# Patient Record
Sex: MALE | Race: WHITE | Employment: FULL TIME | URBAN - METROPOLITAN AREA
[De-identification: names, ages, dates, MRNs, and addresses within clinical notes are randomized per-mention and may not be internally consistent; named-entity substitution may affect disease eponyms.]

---

## 2017-01-09 ENCOUNTER — OFFICE VISIT (OUTPATIENT)
Dept: FAMILY MEDICINE CLINIC | Facility: CLINIC | Age: 62
End: 2017-01-09

## 2017-01-09 ENCOUNTER — TELEPHONE (OUTPATIENT)
Dept: FAMILY MEDICINE CLINIC | Facility: CLINIC | Age: 62
End: 2017-01-09

## 2017-01-09 VITALS
TEMPERATURE: 97 F | DIASTOLIC BLOOD PRESSURE: 76 MMHG | HEIGHT: 70 IN | SYSTOLIC BLOOD PRESSURE: 150 MMHG | HEART RATE: 72 BPM | RESPIRATION RATE: 20 BRPM | BODY MASS INDEX: 30.64 KG/M2 | WEIGHT: 214 LBS

## 2017-01-09 DIAGNOSIS — M54.41 ACUTE RIGHT-SIDED LOW BACK PAIN WITH RIGHT-SIDED SCIATICA: Primary | ICD-10-CM

## 2017-01-09 PROCEDURE — 99214 OFFICE O/P EST MOD 30 MIN: CPT | Performed by: FAMILY MEDICINE

## 2017-01-09 PROCEDURE — 99213 OFFICE O/P EST LOW 20 MIN: CPT | Performed by: FAMILY MEDICINE

## 2017-01-09 PROCEDURE — 96372 THER/PROPH/DIAG INJ SC/IM: CPT | Performed by: FAMILY MEDICINE

## 2017-01-09 RX ORDER — PREDNISONE 20 MG/1
TABLET ORAL
Qty: 18 TABLET | Refills: 0 | Status: SHIPPED | OUTPATIENT
Start: 2017-01-09 | End: 2017-05-05

## 2017-01-09 RX ORDER — KETOROLAC TROMETHAMINE 30 MG/ML
30 INJECTION, SOLUTION INTRAMUSCULAR; INTRAVENOUS ONCE
Status: COMPLETED | OUTPATIENT
Start: 2017-01-09 | End: 2017-01-09

## 2017-01-09 RX ORDER — METHOCARBAMOL 750 MG/1
750 TABLET, FILM COATED ORAL 3 TIMES DAILY
Qty: 40 TABLET | Refills: 0 | Status: SHIPPED | OUTPATIENT
Start: 2017-01-09 | End: 2017-01-19

## 2017-01-09 RX ADMIN — KETOROLAC TROMETHAMINE 30 MG: 30 INJECTION, SOLUTION INTRAMUSCULAR; INTRAVENOUS at 15:04:00

## 2017-01-09 NOTE — TELEPHONE ENCOUNTER
Patient asking if he can something for his discomfort from hip down to ankles. He feels like he has a pinched nerve. Patient is having problems walking. Pain is so bad he is grinding his teeth. He is at work drives a truck.  He will  Be off at 11 AM.  He d

## 2017-01-09 NOTE — PROGRESS NOTES
Patient ID: Kim Adams is a 63year old male.       Telephone Encounter by Cady Okeefe RN at 1/9/2017  8:22 AM      Author: Cady Okeefe RN Service: (none) Author Type: Registered Nurse     Filed: 1/9/2017  8:25 AM Note Time: 1/9/2017  8: what but is in such pain he does not know what to do. He has been placing heat on his back but that has not really helped. He feels a bit of spasm on the right low back.   .     Wt Readings from Last 6 Encounters:  01/09/17 : 214 lb (97.07 kg)  12/27/16 : 20, height 5' 10\" (1.778 m), weight 214 lb (97.07 kg).    01/09/17  1441   BP: 150/76   Pulse: 72   Temp: 97.1 °F (36.2 °C)   TempSrc: Oral   Resp: 20   Height: 5' 10\" (1.778 m)   Weight: 214 lb (97.07 kg)            ASSESSMENT/PLAN:     Diagnoses and all

## 2017-01-09 NOTE — TELEPHONE ENCOUNTER
Patient states has had this issue in the past and was given muscle relaxants he thinks. States that the pain started a few days ago on his right side, states that it goes from his ride back area to his ankle.  States it is shooting pain , has been told

## 2017-01-12 ENCOUNTER — TELEPHONE (OUTPATIENT)
Dept: FAMILY MEDICINE CLINIC | Facility: CLINIC | Age: 62
End: 2017-01-12

## 2017-01-12 RX ORDER — DICLOFENAC SODIUM 75 MG/1
75 TABLET, DELAYED RELEASE ORAL 2 TIMES DAILY
Qty: 42 TABLET | Refills: 0 | Status: SHIPPED | OUTPATIENT
Start: 2017-01-12 | End: 2017-02-02

## 2017-01-12 NOTE — TELEPHONE ENCOUNTER
Spoke to pt, states that he is almost out of his prednisone and is concerned because he is still very uncomfortable. He is taking Methocarbamol once in a while, is unable to take it regularly due to his job as a . Using ice whenever possible.  He is a

## 2017-01-12 NOTE — TELEPHONE ENCOUNTER
Spoke to pt, informed him of medication changes and all other advice/information from Dr.   Informed him call back next week if pain is unchanged and not improving. Pt voices understanding, denies questions at this time.     FYI - Pt states that he was maryuri

## 2017-01-12 NOTE — TELEPHONE ENCOUNTER
The prednisone should last him for 9 days by the way. Is he taking it correctly as I just wrote for it on January 9, 2017.

## 2017-01-12 NOTE — TELEPHONE ENCOUNTER
Stay on the Robaxin for muscle relaxant. After he is finished with the prednisone he can start a medicine called diclofenac that he could take twice daily with food.   They only do a steroid shot into the spinal area if he is still in pain after 6-8 weeks

## 2017-01-12 NOTE — TELEPHONE ENCOUNTER
Pt is having pain when trying to lay down or when walking. Pt has taken all of the medication given to him and he is now out. Pt would like to know what to do next?

## 2017-01-13 NOTE — TELEPHONE ENCOUNTER
While.  He took much too much medicine. He was only supposed to take 3 tablets daily for 3 days and then 2 tablets daily for 3 days and then 1 tablet daily for 3 days. Unfortunately he took the medicines 3 times daily.   We will not fill the prednisone ag

## 2017-01-13 NOTE — TELEPHONE ENCOUNTER
Spoke to patient; went over  VS recent note below. Went over everything regarding patient's medication regimen. Patient feels better today regarding back pain. Patient will start diclofenac if continues with pain.   Discussed to take with food; may use

## 2017-01-30 ENCOUNTER — LAB ENCOUNTER (OUTPATIENT)
Dept: LAB | Age: 62
End: 2017-01-30
Attending: FAMILY MEDICINE
Payer: COMMERCIAL

## 2017-01-30 DIAGNOSIS — Z00.00 ROUTINE GENERAL MEDICAL EXAMINATION AT A HEALTH CARE FACILITY: ICD-10-CM

## 2017-01-30 LAB
ALBUMIN SERPL BCP-MCNC: 4.1 G/DL (ref 3.5–4.8)
ALBUMIN/GLOB SERPL: 1.6 {RATIO} (ref 1–2)
ALP SERPL-CCNC: 65 U/L (ref 32–100)
ALT SERPL-CCNC: 56 U/L (ref 17–63)
ANION GAP SERPL CALC-SCNC: 7 MMOL/L (ref 0–18)
AST SERPL-CCNC: 37 U/L (ref 15–41)
BASOPHILS # BLD: 0.1 K/UL (ref 0–0.2)
BASOPHILS NFR BLD: 1 %
BILIRUB SERPL-MCNC: 1 MG/DL (ref 0.3–1.2)
BUN SERPL-MCNC: 14 MG/DL (ref 8–20)
BUN/CREAT SERPL: 13.5 (ref 10–20)
CALCIUM SERPL-MCNC: 9.6 MG/DL (ref 8.5–10.5)
CHLORIDE SERPL-SCNC: 101 MMOL/L (ref 95–110)
CHOLEST SERPL-MCNC: 234 MG/DL (ref 110–200)
CO2 SERPL-SCNC: 29 MMOL/L (ref 22–32)
CREAT SERPL-MCNC: 1.04 MG/DL (ref 0.5–1.5)
EOSINOPHIL # BLD: 0.3 K/UL (ref 0–0.7)
EOSINOPHIL NFR BLD: 3 %
ERYTHROCYTE [DISTWIDTH] IN BLOOD BY AUTOMATED COUNT: 13.5 % (ref 11–15)
GLOBULIN PLAS-MCNC: 2.5 G/DL (ref 2.5–3.7)
GLUCOSE SERPL-MCNC: 86 MG/DL (ref 70–99)
HCT VFR BLD AUTO: 45.6 % (ref 41–52)
HDLC SERPL-MCNC: 43 MG/DL
HGB BLD-MCNC: 15.4 G/DL (ref 13.5–17.5)
LDLC SERPL CALC-MCNC: 141 MG/DL (ref 0–99)
LYMPHOCYTES # BLD: 2.3 K/UL (ref 1–4)
LYMPHOCYTES NFR BLD: 19 %
MCH RBC QN AUTO: 32.1 PG (ref 27–32)
MCHC RBC AUTO-ENTMCNC: 33.7 G/DL (ref 32–37)
MCV RBC AUTO: 95.4 FL (ref 80–100)
MONOCYTES # BLD: 0.9 K/UL (ref 0–1)
MONOCYTES NFR BLD: 8 %
NEUTROPHILS # BLD AUTO: 8.3 K/UL (ref 1.8–7.7)
NEUTROPHILS NFR BLD: 70 %
NONHDLC SERPL-MCNC: 191 MG/DL
OSMOLALITY UR CALC.SUM OF ELEC: 284 MOSM/KG (ref 275–295)
PLATELET # BLD AUTO: 255 K/UL (ref 140–400)
PMV BLD AUTO: 9 FL (ref 7.4–10.3)
POTASSIUM SERPL-SCNC: 4 MMOL/L (ref 3.3–5.1)
PROT SERPL-MCNC: 6.6 G/DL (ref 5.9–8.4)
PSA SERPL-MCNC: 0.6 NG/ML (ref 0–4)
RBC # BLD AUTO: 4.79 M/UL (ref 4.5–5.9)
SODIUM SERPL-SCNC: 137 MMOL/L (ref 136–144)
TRIGL SERPL-MCNC: 252 MG/DL (ref 1–149)
TSH SERPL-ACNC: 2.11 UIU/ML (ref 0.34–5.6)
WBC # BLD AUTO: 11.9 K/UL (ref 4–11)

## 2017-01-30 PROCEDURE — 85025 COMPLETE CBC W/AUTO DIFF WBC: CPT

## 2017-01-30 PROCEDURE — 84443 ASSAY THYROID STIM HORMONE: CPT

## 2017-01-30 PROCEDURE — 80061 LIPID PANEL: CPT

## 2017-01-30 PROCEDURE — 36415 COLL VENOUS BLD VENIPUNCTURE: CPT

## 2017-01-30 PROCEDURE — 80053 COMPREHEN METABOLIC PANEL: CPT

## 2017-01-30 RX ORDER — ROSUVASTATIN CALCIUM 10 MG/1
10 TABLET, COATED ORAL NIGHTLY
Qty: 30 TABLET | Refills: 3 | Status: SHIPPED | OUTPATIENT
Start: 2017-01-30 | End: 2017-05-05

## 2017-02-27 ENCOUNTER — TELEPHONE (OUTPATIENT)
Dept: FAMILY MEDICINE CLINIC | Facility: CLINIC | Age: 62
End: 2017-02-27

## 2017-02-27 DIAGNOSIS — D72.829 LEUKOCYTOSIS, UNSPECIFIED TYPE: Primary | ICD-10-CM

## 2017-02-27 NOTE — TELEPHONE ENCOUNTER
Pt is asking if he can get WB Count labs done again, states he has taken 2 different steroid medications and 2 different type of Pain medication.   States WB count is high when blood work was done, states before seeing a Hematologist he want to get labs don

## 2017-03-02 ENCOUNTER — TELEPHONE (OUTPATIENT)
Dept: HEMATOLOGY/ONCOLOGY | Facility: HOSPITAL | Age: 62
End: 2017-03-02

## 2017-03-02 NOTE — TELEPHONE ENCOUNTER
Spoke to patient about scheduling appointment.  He is not sure if he wants to make appointment and will speak to Dr Veena Bermeo. Kiki Middleton

## 2017-03-02 NOTE — TELEPHONE ENCOUNTER
Patient calling again to speak with Basilio Rai about medications and orders. Patient is requesting a call back ASAP from RN. States he has been waiting on a call back since Monday 2/27/17.

## 2017-03-04 ENCOUNTER — LAB ENCOUNTER (OUTPATIENT)
Dept: LAB | Age: 62
End: 2017-03-04
Attending: FAMILY MEDICINE
Payer: COMMERCIAL

## 2017-03-04 DIAGNOSIS — D72.829 LEUKOCYTOSIS, UNSPECIFIED TYPE: ICD-10-CM

## 2017-03-04 LAB
BASOPHILS # BLD: 0.1 K/UL (ref 0–0.2)
BASOPHILS NFR BLD: 1 %
EOSINOPHIL # BLD: 0.5 K/UL (ref 0–0.7)
EOSINOPHIL NFR BLD: 4 %
ERYTHROCYTE [DISTWIDTH] IN BLOOD BY AUTOMATED COUNT: 13.8 % (ref 11–15)
HCT VFR BLD AUTO: 45.5 % (ref 41–52)
HGB BLD-MCNC: 15.5 G/DL (ref 13.5–17.5)
LYMPHOCYTES # BLD: 1.7 K/UL (ref 1–4)
LYMPHOCYTES NFR BLD: 16 %
MCH RBC QN AUTO: 32.5 PG (ref 27–32)
MCHC RBC AUTO-ENTMCNC: 34 G/DL (ref 32–37)
MCV RBC AUTO: 95.5 FL (ref 80–100)
MONOCYTES # BLD: 0.9 K/UL (ref 0–1)
MONOCYTES NFR BLD: 9 %
NEUTROPHILS # BLD AUTO: 7.3 K/UL (ref 1.8–7.7)
NEUTROPHILS NFR BLD: 70 %
PLATELET # BLD AUTO: 224 K/UL (ref 140–400)
PMV BLD AUTO: 9 FL (ref 7.4–10.3)
RBC # BLD AUTO: 4.76 M/UL (ref 4.5–5.9)
WBC # BLD AUTO: 10.6 K/UL (ref 4–11)

## 2017-03-04 PROCEDURE — 36415 COLL VENOUS BLD VENIPUNCTURE: CPT

## 2017-03-04 PROCEDURE — 85025 COMPLETE CBC W/AUTO DIFF WBC: CPT

## 2017-03-28 ENCOUNTER — TELEPHONE (OUTPATIENT)
Dept: HEMATOLOGY/ONCOLOGY | Facility: HOSPITAL | Age: 62
End: 2017-03-28

## 2017-03-28 NOTE — TELEPHONE ENCOUNTER
Spoke to patient and no longer needs appt.  He spoke with primary Dr and his labs came back normal. xi

## 2017-05-04 ENCOUNTER — TELEPHONE (OUTPATIENT)
Dept: FAMILY MEDICINE CLINIC | Facility: CLINIC | Age: 62
End: 2017-05-04

## 2017-05-04 NOTE — TELEPHONE ENCOUNTER
Pt is calling state that he is having some right knee pain  Inform pt that office is close will address tomorrow morning

## 2017-05-05 ENCOUNTER — OFFICE VISIT (OUTPATIENT)
Dept: FAMILY MEDICINE CLINIC | Facility: CLINIC | Age: 62
End: 2017-05-05

## 2017-05-05 VITALS
DIASTOLIC BLOOD PRESSURE: 78 MMHG | BODY MASS INDEX: 30.21 KG/M2 | SYSTOLIC BLOOD PRESSURE: 126 MMHG | TEMPERATURE: 98 F | WEIGHT: 211 LBS | HEART RATE: 67 BPM | HEIGHT: 70 IN

## 2017-05-05 DIAGNOSIS — M25.461 EFFUSION, RIGHT KNEE: ICD-10-CM

## 2017-05-05 DIAGNOSIS — M25.561 ACUTE PAIN OF RIGHT KNEE: Primary | ICD-10-CM

## 2017-05-05 DIAGNOSIS — S83.206A: ICD-10-CM

## 2017-05-05 PROCEDURE — 99212 OFFICE O/P EST SF 10 MIN: CPT | Performed by: FAMILY MEDICINE

## 2017-05-05 PROCEDURE — 99214 OFFICE O/P EST MOD 30 MIN: CPT | Performed by: FAMILY MEDICINE

## 2017-05-05 RX ORDER — HYDROCHLOROTHIAZIDE 12.5 MG/1
TABLET ORAL
Refills: 1 | COMMUNITY
Start: 2017-04-27 | End: 2017-08-04

## 2017-05-05 RX ORDER — PREDNISONE 20 MG/1
TABLET ORAL
Qty: 10 TABLET | Refills: 0 | Status: SHIPPED | OUTPATIENT
Start: 2017-05-05 | End: 2017-05-22 | Stop reason: ALTCHOICE

## 2017-05-05 NOTE — PROGRESS NOTES
Patient ID: Karthik Barksdale is a 63year old male.     Actions Requested: Appt scheduled for today 5/5/17 at [facility]  Situation/Background   Problem: Right Knee pain   Onset: 3wks ago twisted knee during work, Increased pain past week states received Willa walk)    Care Advice Discussed:  * Reassurance - Knee Pain  * Pain Medicines  * Pain Medicines - Extra Notes  * Reasons To Call Back      - Moderate pain (e.g., limping) lasts more than 3 days      - Mild pain lasts more than 7 days      - Signs of infecti Allergies:  Lisinopril              Coughing   PHYSICAL EXAM:   Physical Exam  Blood pressure 126/78, pulse 67, temperature 97.5 °F (36.4 °C), temperature source Oral, height 5' 10\" (1.778 m), weight 211 lb (95.709 kg).   Constitutional: Patient is jayne MRI; Future  -     predniSONE 20 MG Oral Tab; Take 2 by mouth at same time daily for 5 days. (Best to take in A. M.)        Follow up if symptoms persist.  Take medicine (if given) as prescribed.   Approach to treatment discussed and patient/family member un

## 2017-05-05 NOTE — TELEPHONE ENCOUNTER
Actions Requested: Appt scheduled for today 5/5/17 at 1640  Situation/Background   Problem: Right Knee pain   Onset: 3wks ago twisted knee during work, Increased pain past week states received Cortisone shot in past.    Associated Symptoms: wakes up at nig Pain  * Pain Medicines  * Pain Medicines - Extra Notes  * Reasons To Call Back      - Moderate pain (e.g., limping) lasts more than 3 days      - Mild pain lasts more than 7 days      - Signs of infection occur (e.g., spreading redness, warmth, fever)

## 2017-05-09 ENCOUNTER — TELEPHONE (OUTPATIENT)
Dept: FAMILY MEDICINE CLINIC | Facility: CLINIC | Age: 62
End: 2017-05-09

## 2017-05-09 DIAGNOSIS — K63.5 POLYP OF COLON, UNSPECIFIED PART OF COLON, UNSPECIFIED TYPE: Primary | ICD-10-CM

## 2017-05-09 NOTE — TELEPHONE ENCOUNTER
FYI: pt just wanted to let Dr. Tomlin know that the prednisone is working very well for his knee.  Thank you

## 2017-05-09 NOTE — TELEPHONE ENCOUNTER
Pt stts that he received a letter from his [employer] stating that he qualifies for a colorectal cancer home test kit. Do we send a script to the pharmacy for this kit?

## 2017-05-13 ENCOUNTER — TELEPHONE (OUTPATIENT)
Dept: FAMILY MEDICINE CLINIC | Facility: CLINIC | Age: 62
End: 2017-05-13

## 2017-05-13 ENCOUNTER — HOSPITAL ENCOUNTER (OUTPATIENT)
Dept: MRI IMAGING | Age: 62
Discharge: HOME | End: 2017-05-13
Attending: FAMILY MEDICINE
Payer: COMMERCIAL

## 2017-05-13 DIAGNOSIS — M25.461 EFFUSION, RIGHT KNEE: ICD-10-CM

## 2017-05-13 DIAGNOSIS — S83.206A: ICD-10-CM

## 2017-05-13 DIAGNOSIS — M25.561 ACUTE PAIN OF RIGHT KNEE: ICD-10-CM

## 2017-05-13 PROCEDURE — 73721 MRI JNT OF LWR EXTRE W/O DYE: CPT | Performed by: FAMILY MEDICINE

## 2017-05-13 NOTE — TELEPHONE ENCOUNTER
[facility] [phone] [facility], then [phone] afterward. Thank you.

## 2017-05-13 NOTE — TELEPHONE ENCOUNTER
----- Message from Cy Wright DO sent at 5/13/2017 12:22 PM CDT -----  Let him know he does have a complex tear of the medial meniscus with a displaced fragment.   Unfortunately that fragment can get caught up in the knee joint and blocked him from [facility]

## 2017-05-15 NOTE — TELEPHONE ENCOUNTER
I get the feeling the insurance companies just trying to save money by having you do a rectal  stool screening instead of a colonoscopy. If you have had polyps you should have had a colonoscopy at least within 5-8 years.   If there are other polyps, the ga

## 2017-05-15 NOTE — TELEPHONE ENCOUNTER
Pt was inform of  message below and verbalized understanding. Pt was also given the number to call [facility]. Thanks

## 2017-05-15 NOTE — TELEPHONE ENCOUNTER
pt stated that he had a colonoscopy done about 6-8 years ago and yes was told he had multiply polyps but that it was nothing to worry about.  He stated that he received a letter from his insurance that he qualifies for a  Free colorectal cancer kit to

## 2017-05-17 NOTE — TELEPHONE ENCOUNTER
Pt contacted and informed of Dr. Cordova Come message. Pt verbalized understanding.  Referral mailed to Pt

## 2017-05-22 ENCOUNTER — OFFICE VISIT (OUTPATIENT)
Dept: ORTHOPEDICS CLINIC | Facility: CLINIC | Age: 62
End: 2017-05-22

## 2017-05-22 DIAGNOSIS — S83.8X1A ACUTE MEDIAL MENISCAL INJURY OF KNEE, RIGHT, INITIAL ENCOUNTER: Primary | ICD-10-CM

## 2017-05-22 PROCEDURE — 99243 OFF/OP CNSLTJ NEW/EST LOW 30: CPT | Performed by: ORTHOPAEDIC SURGERY

## 2017-05-22 PROCEDURE — 99212 OFFICE O/P EST SF 10 MIN: CPT | Performed by: ORTHOPAEDIC SURGERY

## 2017-05-22 NOTE — H&P
Chief Complaint: Right knee pain    NURSING INTAKE COMMENTS: Patient presents with:  Consult: Pt is here for his right knee. Pt states he twisted it about 1 month ago. Did get slightly better but never fully improved. Pt saw Dr. Charmayne Maser.  MRI of the knee jamee Lateral None None      Pes anserinus None None      Patellar tendon None None        Douglas Negative +             Stability Testing        Anterior Drawer Negative Negative      Posterior Drawer Negative Negative      Lachman Negative Negative stiffness, and need for further surgery because of retearing or development of arthritis. I also discussed the rehabilitation that would be necessary afterwards. All the patient's questions were answered.   The patient expressed understanding of my explan

## 2017-05-23 ENCOUNTER — TELEPHONE (OUTPATIENT)
Dept: FAMILY MEDICINE CLINIC | Facility: CLINIC | Age: 62
End: 2017-05-23

## 2017-05-23 NOTE — TELEPHONE ENCOUNTER
Patient had his appointment with Dr. Magno Massey - stated that Dr. Magno Massey advised him that if the pain goes away, surgery for him would not be necessary. However - patient stated that Dr. Tori Khan told him that he did need the surgery.  Mentioned that he's had

## 2017-05-23 NOTE — TELEPHONE ENCOUNTER
In my opinion, I think if you do not do surgery now you will just do it later. You have a complex tear of the meniscus which means will continue to have locking and pain especially on the inner aspect of the knee.

## 2017-05-24 ENCOUNTER — TELEPHONE (OUTPATIENT)
Dept: ORTHOPEDICS CLINIC | Facility: CLINIC | Age: 62
End: 2017-05-24

## 2017-05-24 NOTE — TELEPHONE ENCOUNTER
He will not be able to get Synvisc at the same time as the surgery. It is not indicated for a meniscus tear and would get washed away by the surgery.

## 2017-05-24 NOTE — TELEPHONE ENCOUNTER
Spoke to pt and he is asking about when he has surgery on his right knee for the meniscus tear, will he be able to get Synvisc injection to his knee at the same time. Wants to know if he can get \"everything fixed\" in his knee.  Informed pt that Synvisc in

## 2017-05-25 ENCOUNTER — TELEPHONE (OUTPATIENT)
Dept: ORTHOPEDICS CLINIC | Facility: CLINIC | Age: 62
End: 2017-05-25

## 2017-05-25 NOTE — TELEPHONE ENCOUNTER
Call to [facility], [facility] for out pt surgery authorization  Information taken  To be processed by [employer]. Reference number [id number]    Form thru [employer].   Completed and clinicals faxed

## 2017-05-25 NOTE — TELEPHONE ENCOUNTER
Nerissa [facility] returning call - please advise - thank you.

## 2017-05-31 ENCOUNTER — TELEPHONE (OUTPATIENT)
Dept: ORTHOPEDICS CLINIC | Facility: CLINIC | Age: 62
End: 2017-05-31

## 2017-05-31 NOTE — TELEPHONE ENCOUNTER
Enoc Victoria called from [facility] outpatient surgery called. Asking for a Prior Auth for Tomorrow surgery.

## 2017-05-31 NOTE — TELEPHONE ENCOUNTER
Pt called and states that he was told by his insurance that surgery was authorized. Per pt, auth # is [id number]. Gave me [phone] phone # for [employer].    Informed pt that I will notify [place] and call back

## 2017-05-31 NOTE — TELEPHONE ENCOUNTER
[facility] and spoke to rep named Celestine Coughlin to check status of auth for surgery tomorrow with [place]. Per Celestine Coughlin, request sent to medical management. States she is also sending email to medical management.  States they will call VT cell # once they have reviewed

## 2017-05-31 NOTE — TELEPHONE ENCOUNTER
LMTCB on Pat, surg 's, phone # informing that surgery is cancelled for tomorrow and [place] is calling pt to discuss.

## 2017-05-31 NOTE — TELEPHONE ENCOUNTER
Dr. Bianka Mukherjee, please note messages below. Surgery is scheduled for tomorrow and has not been authorized by insurance yet. Is this surgery considered a medical emergency? Do you want me to go ahead and cancel this surgery? Please advise.

## 2017-05-31 NOTE — TELEPHONE ENCOUNTER
It is not a medical emergency. Cancel surgery. I will call to explain to the patient. Let Bert Robbins know so that she can move my other cases up.

## 2017-05-31 NOTE — TELEPHONE ENCOUNTER
Called and spoke with patient, explained the situation. He asked if I can expedite and I called and placed an expedition request, waiting to hear back.

## 2017-05-31 NOTE — TELEPHONE ENCOUNTER
Called [facility] and spoke to rep named Ellen Yu in regards to status of auth for surgery tomorrow. Surgery has been authorized for tomorrow with [place]. Auth # [id number]. Reference # [id number].

## 2017-05-31 NOTE — TELEPHONE ENCOUNTER
[facility] and spoke to rep named Ayaka.    States that this has been voided by them and is handled by Arabella Ash  Reference # [id number]  Call transferred to Arabella Ash at [phone] and spoke to nurse named Oralia Larose. States they need clinicals and surgery

## 2017-06-01 ENCOUNTER — ANESTHESIA EVENT (OUTPATIENT)
Dept: SURGERY | Facility: HOSPITAL | Age: 62
End: 2017-06-01

## 2017-06-01 ENCOUNTER — ANESTHESIA (OUTPATIENT)
Dept: SURGERY | Facility: HOSPITAL | Age: 62
End: 2017-06-01

## 2017-06-01 ENCOUNTER — HOSPITAL ENCOUNTER (OUTPATIENT)
Facility: HOSPITAL | Age: 62
Setting detail: HOSPITAL OUTPATIENT SURGERY
Discharge: HOME | End: 2017-06-01
Attending: ORTHOPAEDIC SURGERY | Admitting: ORTHOPAEDIC SURGERY
Payer: COMMERCIAL

## 2017-06-01 ENCOUNTER — SURGERY (OUTPATIENT)
Age: 62
End: 2017-06-01

## 2017-06-01 VITALS
DIASTOLIC BLOOD PRESSURE: 78 MMHG | OXYGEN SATURATION: 97 % | BODY MASS INDEX: 29.35 KG/M2 | HEART RATE: 70 BPM | WEIGHT: 205 LBS | HEIGHT: 70 IN | TEMPERATURE: 97 F | SYSTOLIC BLOOD PRESSURE: 125 MMHG | RESPIRATION RATE: 16 BRPM

## 2017-06-01 DIAGNOSIS — S83.289A LATERAL MENISCAL TEAR: Primary | ICD-10-CM

## 2017-06-01 DIAGNOSIS — S83.249A MEDIAL MENISCUS TEAR: ICD-10-CM

## 2017-06-01 PROCEDURE — 0SBD4ZZ EXCISION OF LEFT KNEE JOINT, PERCUTANEOUS ENDOSCOPIC APPROACH: ICD-10-PCS | Performed by: ORTHOPAEDIC SURGERY

## 2017-06-01 RX ORDER — ACETAMINOPHEN 325 MG/1
650 TABLET ORAL ONCE
Status: COMPLETED | OUTPATIENT
Start: 2017-06-01 | End: 2017-06-01

## 2017-06-01 RX ORDER — FAMOTIDINE 20 MG/1
20 TABLET ORAL ONCE
Status: DISCONTINUED | OUTPATIENT
Start: 2017-06-01 | End: 2017-06-01

## 2017-06-01 RX ORDER — ACETAMINOPHEN 325 MG/1
650 TABLET ORAL ONCE
Status: DISCONTINUED | OUTPATIENT
Start: 2017-06-01 | End: 2017-06-01

## 2017-06-01 RX ORDER — ONDANSETRON 2 MG/ML
INJECTION INTRAMUSCULAR; INTRAVENOUS AS NEEDED
Status: DISCONTINUED | OUTPATIENT
Start: 2017-06-01 | End: 2017-06-01 | Stop reason: SURG

## 2017-06-01 RX ORDER — ONDANSETRON 2 MG/ML
4 INJECTION INTRAMUSCULAR; INTRAVENOUS ONCE AS NEEDED
Status: DISCONTINUED | OUTPATIENT
Start: 2017-06-01 | End: 2017-06-01

## 2017-06-01 RX ORDER — BUPIVACAINE HYDROCHLORIDE AND EPINEPHRINE 2.5; 5 MG/ML; UG/ML
INJECTION, SOLUTION INFILTRATION; PERINEURAL AS NEEDED
Status: DISCONTINUED | OUTPATIENT
Start: 2017-06-01 | End: 2017-06-01 | Stop reason: HOSPADM

## 2017-06-01 RX ORDER — GLYCOPYRROLATE 0.2 MG/ML
INJECTION INTRAMUSCULAR; INTRAVENOUS AS NEEDED
Status: DISCONTINUED | OUTPATIENT
Start: 2017-06-01 | End: 2017-06-01 | Stop reason: SURG

## 2017-06-01 RX ORDER — FAMOTIDINE 20 MG/1
20 TABLET ORAL ONCE
Status: COMPLETED | OUTPATIENT
Start: 2017-06-01 | End: 2017-06-01

## 2017-06-01 RX ORDER — MORPHINE SULFATE 4 MG/ML
4 INJECTION, SOLUTION INTRAMUSCULAR; INTRAVENOUS EVERY 10 MIN PRN
Status: DISCONTINUED | OUTPATIENT
Start: 2017-06-01 | End: 2017-06-01

## 2017-06-01 RX ORDER — METOCLOPRAMIDE 10 MG/1
10 TABLET ORAL ONCE
Status: DISCONTINUED | OUTPATIENT
Start: 2017-06-01 | End: 2017-06-01 | Stop reason: HOSPADM

## 2017-06-01 RX ORDER — HYDROCODONE BITARTRATE AND ACETAMINOPHEN 10; 325 MG/1; MG/1
1-2 TABLET ORAL EVERY 6 HOURS PRN
Qty: 40 TABLET | Refills: 0 | Status: SHIPPED | OUTPATIENT
Start: 2017-06-01 | End: 2017-06-11

## 2017-06-01 RX ORDER — HYDROCODONE BITARTRATE AND ACETAMINOPHEN 5; 325 MG/1; MG/1
2 TABLET ORAL AS NEEDED
Status: DISCONTINUED | OUTPATIENT
Start: 2017-06-01 | End: 2017-06-01

## 2017-06-01 RX ORDER — NALOXONE HYDROCHLORIDE 0.4 MG/ML
80 INJECTION, SOLUTION INTRAMUSCULAR; INTRAVENOUS; SUBCUTANEOUS AS NEEDED
Status: DISCONTINUED | OUTPATIENT
Start: 2017-06-01 | End: 2017-06-01

## 2017-06-01 RX ORDER — MORPHINE SULFATE 10 MG/ML
6 INJECTION, SOLUTION INTRAMUSCULAR; INTRAVENOUS EVERY 10 MIN PRN
Status: DISCONTINUED | OUTPATIENT
Start: 2017-06-01 | End: 2017-06-01

## 2017-06-01 RX ORDER — SODIUM CHLORIDE, SODIUM LACTATE, POTASSIUM CHLORIDE, CALCIUM CHLORIDE 600; 310; 30; 20 MG/100ML; MG/100ML; MG/100ML; MG/100ML
INJECTION, SOLUTION INTRAVENOUS CONTINUOUS
Status: DISCONTINUED | OUTPATIENT
Start: 2017-06-01 | End: 2017-06-01

## 2017-06-01 RX ORDER — DEXAMETHASONE SODIUM PHOSPHATE 4 MG/ML
VIAL (ML) INJECTION AS NEEDED
Status: DISCONTINUED | OUTPATIENT
Start: 2017-06-01 | End: 2017-06-01 | Stop reason: SURG

## 2017-06-01 RX ORDER — METOCLOPRAMIDE 10 MG/1
10 TABLET ORAL ONCE
Status: DISCONTINUED | OUTPATIENT
Start: 2017-06-01 | End: 2017-06-01

## 2017-06-01 RX ORDER — HALOPERIDOL 5 MG/ML
0.25 INJECTION INTRAMUSCULAR ONCE AS NEEDED
Status: DISCONTINUED | OUTPATIENT
Start: 2017-06-01 | End: 2017-06-01

## 2017-06-01 RX ORDER — ASPIRIN 325 MG
325 TABLET ORAL DAILY
Qty: 14 TABLET | Refills: 0 | Status: SHIPPED | OUTPATIENT
Start: 2017-06-01 | End: 2018-10-22

## 2017-06-01 RX ORDER — HYDROMORPHONE HYDROCHLORIDE 1 MG/ML
0.2 INJECTION, SOLUTION INTRAMUSCULAR; INTRAVENOUS; SUBCUTANEOUS EVERY 5 MIN PRN
Status: DISCONTINUED | OUTPATIENT
Start: 2017-06-01 | End: 2017-06-01

## 2017-06-01 RX ORDER — HYDROMORPHONE HYDROCHLORIDE 1 MG/ML
0.6 INJECTION, SOLUTION INTRAMUSCULAR; INTRAVENOUS; SUBCUTANEOUS EVERY 5 MIN PRN
Status: DISCONTINUED | OUTPATIENT
Start: 2017-06-01 | End: 2017-06-01

## 2017-06-01 RX ORDER — LIDOCAINE HYDROCHLORIDE 10 MG/ML
INJECTION, SOLUTION EPIDURAL; INFILTRATION; INTRACAUDAL; PERINEURAL AS NEEDED
Status: DISCONTINUED | OUTPATIENT
Start: 2017-06-01 | End: 2017-06-01 | Stop reason: SURG

## 2017-06-01 RX ORDER — HYDROMORPHONE HYDROCHLORIDE 1 MG/ML
0.4 INJECTION, SOLUTION INTRAMUSCULAR; INTRAVENOUS; SUBCUTANEOUS EVERY 5 MIN PRN
Status: DISCONTINUED | OUTPATIENT
Start: 2017-06-01 | End: 2017-06-01

## 2017-06-01 RX ORDER — HYDROCODONE BITARTRATE AND ACETAMINOPHEN 5; 325 MG/1; MG/1
1 TABLET ORAL AS NEEDED
Status: DISCONTINUED | OUTPATIENT
Start: 2017-06-01 | End: 2017-06-01

## 2017-06-01 RX ORDER — HYDROCODONE BITARTRATE AND ACETAMINOPHEN 10; 325 MG/1; MG/1
1 TABLET ORAL EVERY 4 HOURS PRN
Status: DISCONTINUED | OUTPATIENT
Start: 2017-06-01 | End: 2017-06-01

## 2017-06-01 RX ORDER — MORPHINE SULFATE 2 MG/ML
2 INJECTION, SOLUTION INTRAMUSCULAR; INTRAVENOUS EVERY 10 MIN PRN
Status: DISCONTINUED | OUTPATIENT
Start: 2017-06-01 | End: 2017-06-01

## 2017-06-01 RX ORDER — MIDAZOLAM HYDROCHLORIDE 1 MG/ML
INJECTION INTRAMUSCULAR; INTRAVENOUS AS NEEDED
Status: DISCONTINUED | OUTPATIENT
Start: 2017-06-01 | End: 2017-06-01 | Stop reason: SURG

## 2017-06-01 RX ADMIN — ONDANSETRON 4 MG: 2 INJECTION INTRAMUSCULAR; INTRAVENOUS at 12:12:00

## 2017-06-01 RX ADMIN — SODIUM CHLORIDE, SODIUM LACTATE, POTASSIUM CHLORIDE, CALCIUM CHLORIDE: 600; 310; 30; 20 INJECTION, SOLUTION INTRAVENOUS at 10:27:00

## 2017-06-01 RX ADMIN — SODIUM CHLORIDE, SODIUM LACTATE, POTASSIUM CHLORIDE, CALCIUM CHLORIDE: 600; 310; 30; 20 INJECTION, SOLUTION INTRAVENOUS at 12:16:00

## 2017-06-01 RX ADMIN — GLYCOPYRROLATE 0.2 MG: 0.2 INJECTION INTRAMUSCULAR; INTRAVENOUS at 11:59:00

## 2017-06-01 RX ADMIN — DEXAMETHASONE SODIUM PHOSPHATE 4 MG: 4 MG/ML VIAL (ML) INJECTION at 12:12:00

## 2017-06-01 RX ADMIN — LIDOCAINE HYDROCHLORIDE 50 MG: 10 INJECTION, SOLUTION EPIDURAL; INFILTRATION; INTRACAUDAL; PERINEURAL at 11:59:00

## 2017-06-01 RX ADMIN — MIDAZOLAM HYDROCHLORIDE 2 MG: 1 INJECTION INTRAMUSCULAR; INTRAVENOUS at 11:56:00

## 2017-06-01 NOTE — BRIEF OP NOTE
Pre-Operative Diagnosis: Right knee internal derangement      Post-Operative Diagnosis: right knee medial and lateral meniscus tears     Procedure Performed:   Procedure(s):  Right knee arthroscopy, partial medial and lateral meniscectomy    Surgeon(s)

## 2017-06-01 NOTE — INTERVAL H&P NOTE
Pre-op Diagnosis: Right knee internal derangement     The above referenced H&P was reviewed by Deanne Clemons MD on 6/1/2017, the patient was examined and no significant changes have occurred in the patient's condition since the H&P was performed.   I

## 2017-06-01 NOTE — ANESTHESIA PREPROCEDURE EVALUATION
Anesthesia PreOp Note    HPI:     Steven Harris is a 63year old male who presents for preoperative consultation requested by:  Ana Maria Marti MD    Date of Surgery: 6/1/2017    Procedure(s):  KNEE ARTHROSCOPY  Indication: Right knee internal dera Epic:  lactated ringers infusion  Intravenous Continuous Danial Marcum MD   Metoclopramide HCl (REGLAN) tab 10 mg 10 mg Oral Once Rick Guerrero MD   CeFAZolin Sodium (ANCEF/KEFZOL) IVPB premix 2 g 2 g Intravenous Once Rick Guerrero MD full  Dental - normal exam     Pulmonary - normal exam   Cardiovascular - normal exam    Neuro/Psych - negative ROS     GI/Hepatic/Renal - negative ROS     Endo/Other - negative ROS   Abdominal              Anesthesia Plan:   ASA:  3  Plan:   General  Post

## 2017-06-01 NOTE — OPERATIVE REPORT
[facility]    PATIENT'S NAME: Zahida Germain   ATTENDING PHYSICIAN: Reuben Robles MD   OPERATING PHYSICIAN: Reuben Robles MD   PATIENT ACCOUNT#:   [id number]    LOCATION:  [facility] 10  MEDICAL RECORD #:   [id number]       YOBANI of the patellofemoral compartment. No loose bodies were seen in the suprapatellar pouch or either of the gutters. ACL and PCL were intact to probing.     PROCEDURE:  The patient was met in the preoperative holding area and the correct site was identified

## 2017-06-01 NOTE — ANESTHESIA POSTPROCEDURE EVALUATION
Patient: Stanley Huerta    Procedure Summary     Date Anesthesia Start Anesthesia Stop Room / Location    06/01/17 1155 1236 300 [facility] MAIN OR 03 / 300 [facility] MAIN OR       Procedure Diagnosis Surgeon Responsible Provider    KNEE ARTHROSCOPY (Right ) (right knee med

## 2017-06-01 NOTE — H&P (VIEW-ONLY)
Chief Complaint: Right knee pain    NURSING INTAKE COMMENTS: Patient presents with:  Consult: Pt is here for his right knee. Pt states he twisted it about 1 month ago. Did get slightly better but never fully improved. Pt saw Dr. Tomlin.  MRI of the knee jamee Lateral None None      Pes anserinus None None      Patellar tendon None None        Douglas Negative +             Stability Testing        Anterior Drawer Negative Negative      Posterior Drawer Negative Negative      Lachman Negative Negative stiffness, and need for further surgery because of retearing or development of arthritis. I also discussed the rehabilitation that would be necessary afterwards. All the patient's questions were answered.   The patient expressed understanding of my explan

## 2017-06-05 ENCOUNTER — TELEPHONE (OUTPATIENT)
Dept: ORTHOPEDICS CLINIC | Facility: CLINIC | Age: 62
End: 2017-06-05

## 2017-06-05 NOTE — TELEPHONE ENCOUNTER
pt called. Requesting a return to work note. pt would like to return to work tomorrow. Please fax to [phone] Attn to [facility].   Thank you

## 2017-06-06 ENCOUNTER — TELEPHONE (OUTPATIENT)
Dept: ORTHOPEDICS CLINIC | Facility: CLINIC | Age: 62
End: 2017-06-06

## 2017-06-06 NOTE — TELEPHONE ENCOUNTER
He really should just be light duty, no operation of hazardous machinery, sitting job with minimum walking, no lifting greater than 20lbs. Once I see him in follow up, he can advance his activity to full.

## 2017-06-06 NOTE — TELEPHONE ENCOUNTER
Spoke to pt and discussed work note. Pt asking about restrictions for work and would like to have this included in work note.    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

## 2017-06-06 NOTE — TELEPHONE ENCOUNTER
Spoke to pt and discussed work note described below by VT. Pt okay with note and to RTW on 06/07/17. Informed that I will fax note as requested below. Note faxed.

## 2017-06-06 NOTE — TELEPHONE ENCOUNTER
Spoke to pt. He states that his work called him and informed him, after receiving faxed RTW note today, that they \"have no sitting jobs\" for him right now. 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  Pt claims he has not taken any pain meds

## 2017-06-06 NOTE — TELEPHONE ENCOUNTER
pt called. He needs to be released to go back to work. He wont be paid while off work and he cannot afford not to work. (refer to TE 6/5/17)  Please advise.

## 2017-06-07 NOTE — TELEPHONE ENCOUNTER
S/w pt and he would like the note to be full duty/ no restrictions on 6/12/17. Fax to [employer] [phone]. Note faxed.

## 2017-06-14 ENCOUNTER — OFFICE VISIT (OUTPATIENT)
Dept: ORTHOPEDICS CLINIC | Facility: CLINIC | Age: 62
End: 2017-06-14

## 2017-06-14 DIAGNOSIS — S83.8X1A ACUTE MEDIAL MENISCAL INJURY OF KNEE, RIGHT, INITIAL ENCOUNTER: Primary | ICD-10-CM

## 2017-06-14 PROCEDURE — 99212 OFFICE O/P EST SF 10 MIN: CPT | Performed by: ORTHOPAEDIC SURGERY

## 2017-06-14 PROCEDURE — 99024 POSTOP FOLLOW-UP VISIT: CPT | Performed by: ORTHOPAEDIC SURGERY

## 2017-06-14 NOTE — PROGRESS NOTES
NURSING INTAKE COMMENTS: Patient presents with:  Post-Op: Pt is here for post op-erative visit. Right knee arthroscopy surgery medial and lateral meniscus repair. Pain still has some soreness. Pain scale  0 and sometime up to 1. Went back to work.        [place]

## 2017-07-31 DIAGNOSIS — I10 ESSENTIAL HYPERTENSION, BENIGN: ICD-10-CM

## 2017-07-31 NOTE — TELEPHONE ENCOUNTER
Patient called and stated need a new refill request for medication-  Current Outpatient Prescriptions:  hydrochlorothiazide 12.5 MG Oral Tab  Disp:  Rfl: 1   Losartan Potassium 25 MG Oral Tab Take 1 tablet (25 mg total) by mouth daily.  Disp: 90 tablet Rfl:

## 2017-08-02 ENCOUNTER — OCC HEALTH (OUTPATIENT)
Dept: OCCUPATIONAL MEDICINE | Age: 62
End: 2017-08-02
Attending: PREVENTIVE MEDICINE

## 2017-08-04 NOTE — TELEPHONE ENCOUNTER
Hypertensive Medications - Last refills Hydrochlorothiazide 4/27/17, Losartan Potassium 12/27/16. Prescriptions pended.   Protocol Criteria:  · Appointment scheduled in the past 6 months or in the next 3 months  · BMP or CMP in the past 12 months  · Creati

## 2017-08-06 RX ORDER — HYDROCHLOROTHIAZIDE 12.5 MG/1
12.5 TABLET ORAL DAILY
Qty: 90 TABLET | Refills: 1 | Status: SHIPPED | OUTPATIENT
Start: 2017-08-06 | End: 2017-11-24

## 2017-08-06 RX ORDER — LOSARTAN POTASSIUM 25 MG/1
25 TABLET ORAL DAILY
Qty: 90 TABLET | Refills: 1 | Status: SHIPPED | OUTPATIENT
Start: 2017-08-06 | End: 2017-11-24

## 2017-08-30 ENCOUNTER — OFFICE VISIT (OUTPATIENT)
Dept: OCCUPATIONAL MEDICINE | Age: 62
End: 2017-08-30
Attending: PHYSICIAN ASSISTANT

## 2017-11-24 ENCOUNTER — OFFICE VISIT (OUTPATIENT)
Dept: FAMILY MEDICINE CLINIC | Facility: CLINIC | Age: 62
End: 2017-11-24

## 2017-11-24 VITALS
BODY MASS INDEX: 30.87 KG/M2 | DIASTOLIC BLOOD PRESSURE: 70 MMHG | WEIGHT: 215.63 LBS | HEART RATE: 68 BPM | RESPIRATION RATE: 14 BRPM | TEMPERATURE: 98 F | SYSTOLIC BLOOD PRESSURE: 131 MMHG | HEIGHT: 70 IN

## 2017-11-24 DIAGNOSIS — E78.2 MIXED HYPERLIPIDEMIA: ICD-10-CM

## 2017-11-24 DIAGNOSIS — I10 ESSENTIAL HYPERTENSION, BENIGN: Primary | ICD-10-CM

## 2017-11-24 DIAGNOSIS — Z23 NEED FOR VACCINATION: ICD-10-CM

## 2017-11-24 DIAGNOSIS — E66.09 NON MORBID OBESITY DUE TO EXCESS CALORIES: ICD-10-CM

## 2017-11-24 PROCEDURE — 99212 OFFICE O/P EST SF 10 MIN: CPT | Performed by: FAMILY MEDICINE

## 2017-11-24 PROCEDURE — 99214 OFFICE O/P EST MOD 30 MIN: CPT | Performed by: FAMILY MEDICINE

## 2017-11-24 RX ORDER — LOSARTAN POTASSIUM 25 MG/1
25 TABLET ORAL DAILY
Qty: 90 TABLET | Refills: 1 | Status: SHIPPED | OUTPATIENT
Start: 2017-11-24 | End: 2018-04-14

## 2017-11-24 RX ORDER — HYDROCHLOROTHIAZIDE 12.5 MG/1
12.5 TABLET ORAL DAILY
Qty: 90 TABLET | Refills: 1 | Status: SHIPPED | OUTPATIENT
Start: 2017-11-24 | End: 2018-04-14

## 2017-11-24 NOTE — PROGRESS NOTES
Patient ID: Ion Angela is a 63year old male. HPI  Patient presents with:  HTN    He did gain about 10 pounds since June. He is compliant with his blood pressure medication. He knows he needs to start working out again.   He is not having any c Armand Goldman, NMIC, WBCUR, RBCUR, EPIUR, HYALCASTURN, BACUR, CAOXUR, HYLUR, MICCOM  No results found for: EAG, A1C    No results found for: Sigel Due, CREAURINE, MIALBURINE, MCRRATIOUR, MALBCRECALC, MICROALBUMIN, CREAUR, MALBCREACALC      Lab Res Take 1 tablet (12.5 mg total) by mouth daily. Disp: 90 tablet Rfl: 1   Losartan Potassium 25 MG Oral Tab Take 1 tablet (25 mg total) by mouth daily. Disp: 90 tablet Rfl: 1   aspirin 325 MG Oral Tab Take 1 tablet (325 mg total) by mouth daily.  Disp: 14 tabl afterwards but he does not like going to the gym after eating. I told him that he can go to [employer], the exercise location that he has a membership to immediately after work.   He realizes this is actually a good idea and will get his workup done be

## 2017-12-09 ENCOUNTER — NURSE TRIAGE (OUTPATIENT)
Dept: OTHER | Age: 62
End: 2017-12-09

## 2017-12-09 ENCOUNTER — HOSPITAL ENCOUNTER (OUTPATIENT)
Age: 62
Discharge: HOME | End: 2017-12-09
Attending: EMERGENCY MEDICINE
Payer: COMMERCIAL

## 2017-12-09 ENCOUNTER — APPOINTMENT (OUTPATIENT)
Dept: GENERAL RADIOLOGY | Age: 62
End: 2017-12-09
Attending: EMERGENCY MEDICINE
Payer: COMMERCIAL

## 2017-12-09 VITALS
BODY MASS INDEX: 30.06 KG/M2 | TEMPERATURE: 100 F | HEIGHT: 70 IN | WEIGHT: 210 LBS | SYSTOLIC BLOOD PRESSURE: 122 MMHG | HEART RATE: 94 BPM | RESPIRATION RATE: 18 BRPM | DIASTOLIC BLOOD PRESSURE: 73 MMHG | OXYGEN SATURATION: 93 %

## 2017-12-09 DIAGNOSIS — R91.1 LESION OF RIGHT LUNG: ICD-10-CM

## 2017-12-09 DIAGNOSIS — J18.9 COMMUNITY ACQUIRED PNEUMONIA OF RIGHT UPPER LOBE OF LUNG: Primary | ICD-10-CM

## 2017-12-09 PROCEDURE — 99214 OFFICE O/P EST MOD 30 MIN: CPT

## 2017-12-09 PROCEDURE — 71020 XR CHEST PA + LAT CHEST (CPT=71020): CPT | Performed by: EMERGENCY MEDICINE

## 2017-12-09 PROCEDURE — 99213 OFFICE O/P EST LOW 20 MIN: CPT

## 2017-12-09 RX ORDER — LEVOFLOXACIN 750 MG/1
750 TABLET ORAL DAILY
Qty: 7 TABLET | Refills: 0 | Status: SHIPPED | OUTPATIENT
Start: 2017-12-09 | End: 2017-12-16

## 2017-12-09 RX ORDER — BENZONATATE 100 MG/1
100 CAPSULE ORAL 3 TIMES DAILY PRN
Qty: 30 CAPSULE | Refills: 0 | Status: SHIPPED | OUTPATIENT
Start: 2017-12-09 | End: 2018-01-08

## 2017-12-09 NOTE — ED PROVIDER NOTES
Patient Seen in: [facility] In [address] [place] [address]    History   Patient presents with:  Fever (infectious)    Stated Complaint: fever    HPI    Patient complains of shortness of breath that began 4 days ago, fever,  cough. denies chest wesley. HPI.    Physical Exam   ED Triage Vitals [12/09/17 0935]  BP: 122/73  Pulse: 94  Resp: 18  Temp: 99.9 °F (37.7 °C)  Temp src: Oral  SpO2: 93 %  O2 Device: None (Room air)    Current:/73   Pulse 94   Temp 99.9 °F (37.7 °C) (Oral)   Resp 18   Ht 177.8 55949  [phone]          [address] [facility] [address]  [phone]    In 1 week          Medications Prescribed:  Discharge Medication List as of 12/9/2017 10:50 AM    START taking these medications    levofloxaci

## 2017-12-09 NOTE — TELEPHONE ENCOUNTER
Pt states currently driving to [facility] as did not go yesterday. Denies further questions/concerns at this time.

## 2017-12-09 NOTE — TELEPHONE ENCOUNTER
Action Requested: Summary for Provider     []  Critical Lab, Recommendations Needed  [] Need Additional Advice  []   FYI    []   Need Orders  [] Need Medications Sent to Pharmacy  []  Other     SUMMARY: Received call from patient's wife Jose L Caicedo, who is on pat

## 2017-12-11 ENCOUNTER — TELEPHONE (OUTPATIENT)
Dept: OTHER | Age: 62
End: 2017-12-11

## 2017-12-11 DIAGNOSIS — G47.30 SLEEP APNEA, UNSPECIFIED TYPE: Primary | ICD-10-CM

## 2017-12-11 NOTE — TELEPHONE ENCOUNTER
Patient stated the CPAP machine he has now is not what was ordered. Patient called his insurance company and was told to sent the order and all the information to:     [email or web address]   Fax number . Phone number [phone].     Face sheet, ord

## 2017-12-11 NOTE — TELEPHONE ENCOUNTER
Patient was seen in Urgent Care 12/9/17 and diagnosed with pneumonia.  He called because he uses CPAP for his snoring, but the machine he has now delivers cold dry air only and he can't sleep with this, he feels this air down his throat, it gags him, and he

## 2017-12-11 NOTE — TELEPHONE ENCOUNTER
NTERPRETATION:  A favorable response was demonstrated to CPAP 9 CWP with  significant delimitation of respiratory events and optimization of comfort.       RECOMMENDATION:  1.  CPAP 9 CWP with C-Flex set to 3 CWP using a [employer] Eson  medium mask

## 2017-12-20 ENCOUNTER — TELEPHONE (OUTPATIENT)
Dept: FAMILY MEDICINE CLINIC | Facility: CLINIC | Age: 62
End: 2017-12-20

## 2017-12-20 NOTE — TELEPHONE ENCOUNTER
See message below. This was already addressed by Cyrus Campbell who faxed orders on 12/11/17. Pt was informed by [employer] didn't receive paperwork and pt frustrated he is getting the run around.  I found faxed paperwork faxed by Cyrus Campbell and refaxed to

## 2017-12-20 NOTE — TELEPHONE ENCOUNTER
Rayo Cuellar is calling to request orders for CPAP supplies. Will also need a copy of diagnostic sleep study, prescription for the supplies, diagnostic code, and demographic sheet. Please send to fax number below. Please advise.      Fax [phone]

## 2018-01-10 ENCOUNTER — TELEPHONE (OUTPATIENT)
Dept: OTHER | Age: 63
End: 2018-01-10

## 2018-01-10 DIAGNOSIS — G47.33 OSA ON CPAP: Primary | ICD-10-CM

## 2018-01-10 DIAGNOSIS — Z99.89 OSA ON CPAP: Primary | ICD-10-CM

## 2018-01-10 NOTE — TELEPHONE ENCOUNTER
April calling to request an order for a CPAP mask, [employer], under the nose and states order can be faxed to [phone].

## 2018-01-10 NOTE — TELEPHONE ENCOUNTER
RIA Garcia pt just wanted me to inform you that a [employer]  representative will be calling you and requesting a Cpap mask order. He is the only one that knows the name of this cpap mask.  I inform that was ok to have him call us and provide us with the model an

## 2018-02-13 ENCOUNTER — TELEPHONE (OUTPATIENT)
Dept: FAMILY MEDICINE CLINIC | Facility: CLINIC | Age: 63
End: 2018-02-13

## 2018-02-13 DIAGNOSIS — J18.9 COMMUNITY ACQUIRED PNEUMONIA OF RIGHT UPPER LOBE OF LUNG: ICD-10-CM

## 2018-02-13 DIAGNOSIS — J98.4 PULMONARY LESION, RIGHT: ICD-10-CM

## 2018-02-13 DIAGNOSIS — Z00.00 ADULT GENERAL MEDICAL EXAM: Primary | ICD-10-CM

## 2018-02-13 NOTE — TELEPHONE ENCOUNTER
Pt called stating that the lab work would be cheaper through [address] [place] [address] is requesting that the lab orders be printed and he can pick them up. Pt also requesting a written order for his chest x-ray for Ambulatory services as this would be cheaper as well.  Pt

## 2018-02-15 NOTE — TELEPHONE ENCOUNTER
Patient calling (verified name and ), advised Dr Torre Dire note and verbalized understanding.  States that will be going tomorrow to  the order scripts, states that he is not due for his physical yet,and will call us back to schedule the physical,

## 2018-02-15 NOTE — TELEPHONE ENCOUNTER
Okay, he is due for a physical.  Once I get the lab results back in the chest x-ray he needs to see me.   They were worried about a lesion in his right upper lobe of the lung in addition to the pneumonia that he had from a December chest x-ray that he had d

## 2018-03-04 LAB
ABSOLUTE BASOPHILS: 77 CELLS/UL (ref 0–200)
ABSOLUTE EOSINOPHILS: 330 CELLS/UL (ref 15–500)
ABSOLUTE LYMPHOCYTES: 2310 CELLS/UL (ref 850–3900)
ABSOLUTE MONOCYTES: 814 CELLS/UL (ref 200–950)
ABSOLUTE NEUTROPHILS: 7469 CELLS/UL (ref 1500–7800)
ALBUMIN/GLOBULIN RATIO: 1.6 (CALC) (ref 1–2.5)
ALBUMIN: 4.1 G/DL (ref 3.6–5.1)
ALKALINE PHOSPHATASE: 81 U/L (ref 40–115)
ALT: 29 U/L (ref 9–46)
AST: 22 U/L (ref 10–35)
BASOPHILS: 0.7 %
BILIRUBIN, TOTAL: 0.4 MG/DL (ref 0.2–1.2)
BUN: 20 MG/DL (ref 7–25)
CALCIUM: 9.7 MG/DL (ref 8.6–10.3)
CARBON DIOXIDE: 29 MMOL/L (ref 20–31)
CHLORIDE: 103 MMOL/L (ref 98–110)
CHOL/HDLC RATIO: 5.4 (CALC)
CHOLESTEROL, TOTAL: 217 MG/DL
CREATININE: 0.94 MG/DL (ref 0.7–1.25)
EGFR IF AFRICN AM: 102 ML/MIN/1.73M2
EGFR IF NONAFRICN AM: 88 ML/MIN/1.73M2
EOSINOPHILS: 3 %
GLOBULIN: 2.6 G/DL (CALC) (ref 1.9–3.7)
GLUCOSE: 98 MG/DL (ref 65–99)
HDL CHOLESTEROL: 40 MG/DL
HEMATOCRIT: 48.7 % (ref 38.5–50)
HEMOGLOBIN: 15.9 G/DL (ref 13.2–17.1)
LDL-CHOLESTEROL: 141 MG/DL (CALC)
LYMPHOCYTES: 21 %
MCH: 31.1 PG (ref 27–33)
MCHC: 32.6 G/DL (ref 32–36)
MCV: 95.1 FL (ref 80–100)
MONOCYTES: 7.4 %
MPV: 10.3 FL (ref 7.5–12.5)
NEUTROPHILS: 67.9 %
NON-HDL CHOLESTEROL: 177 MG/DL (CALC)
PLATELET COUNT: 329 THOUSAND/UL (ref 140–400)
POTASSIUM: 4.5 MMOL/L (ref 3.5–5.3)
PROTEIN, TOTAL: 6.7 G/DL (ref 6.1–8.1)
PSA, TOTAL: 1.2 NG/ML
RDW: 13.7 % (ref 11–15)
RED BLOOD CELL COUNT: 5.12 MILLION/UL (ref 4.2–5.8)
SODIUM: 139 MMOL/L (ref 135–146)
TRIGLYCERIDES: 214 MG/DL
TSH: 0.9 MIU/L (ref 0.4–4.5)
WHITE BLOOD CELL COUNT: 11 THOUSAND/UL (ref 3.8–10.8)

## 2018-03-12 ENCOUNTER — TELEPHONE (OUTPATIENT)
Dept: FAMILY MEDICINE CLINIC | Facility: CLINIC | Age: 63
End: 2018-03-12

## 2018-03-13 NOTE — TELEPHONE ENCOUNTER
Spoke with patient (identified name and ), results reviewed and agrees with plan.   appt made 18 10:20

## 2018-03-13 NOTE — TELEPHONE ENCOUNTER
----- Message from Violet Gallegos, DO sent at 3/12/2018 10:58 PM CDT -----  See me in the near future. Your cholesterol remains high. I think this is more genetic in nature.   Now also you are 63years of age and being a male you are in a much higher risk

## 2018-04-14 ENCOUNTER — TELEPHONE (OUTPATIENT)
Dept: FAMILY MEDICINE CLINIC | Facility: CLINIC | Age: 63
End: 2018-04-14

## 2018-04-14 ENCOUNTER — OFFICE VISIT (OUTPATIENT)
Dept: FAMILY MEDICINE CLINIC | Facility: CLINIC | Age: 63
End: 2018-04-14

## 2018-04-14 VITALS
HEART RATE: 54 BPM | WEIGHT: 218 LBS | DIASTOLIC BLOOD PRESSURE: 75 MMHG | TEMPERATURE: 98 F | SYSTOLIC BLOOD PRESSURE: 127 MMHG | BODY MASS INDEX: 31.21 KG/M2 | HEIGHT: 70 IN

## 2018-04-14 DIAGNOSIS — Z12.11 SCREENING FOR COLON CANCER: ICD-10-CM

## 2018-04-14 DIAGNOSIS — K63.5 POLYP OF COLON, UNSPECIFIED PART OF COLON, UNSPECIFIED TYPE: ICD-10-CM

## 2018-04-14 DIAGNOSIS — J98.4 PULMONARY LESION, RIGHT: ICD-10-CM

## 2018-04-14 DIAGNOSIS — D72.829 LEUKOCYTOSIS, UNSPECIFIED TYPE: ICD-10-CM

## 2018-04-14 DIAGNOSIS — Z00.00 ADULT GENERAL MEDICAL EXAM: Primary | ICD-10-CM

## 2018-04-14 DIAGNOSIS — F17.200 TOBACCO USE DISORDER: ICD-10-CM

## 2018-04-14 DIAGNOSIS — E78.2 MIXED HYPERLIPIDEMIA: ICD-10-CM

## 2018-04-14 DIAGNOSIS — I10 ESSENTIAL HYPERTENSION, BENIGN: ICD-10-CM

## 2018-04-14 PROCEDURE — 99406 BEHAV CHNG SMOKING 3-10 MIN: CPT | Performed by: FAMILY MEDICINE

## 2018-04-14 PROCEDURE — 99212 OFFICE O/P EST SF 10 MIN: CPT | Performed by: FAMILY MEDICINE

## 2018-04-14 PROCEDURE — 99396 PREV VISIT EST AGE 40-64: CPT | Performed by: FAMILY MEDICINE

## 2018-04-14 PROCEDURE — 99213 OFFICE O/P EST LOW 20 MIN: CPT | Performed by: FAMILY MEDICINE

## 2018-04-14 RX ORDER — HYDROCHLOROTHIAZIDE 12.5 MG/1
12.5 TABLET ORAL DAILY
Qty: 90 TABLET | Refills: 1 | Status: SHIPPED | OUTPATIENT
Start: 2018-04-14 | End: 2018-10-22

## 2018-04-14 RX ORDER — LOSARTAN POTASSIUM 25 MG/1
25 TABLET ORAL DAILY
Qty: 90 TABLET | Refills: 1 | Status: SHIPPED | OUTPATIENT
Start: 2018-04-14 | End: 2018-10-22

## 2018-04-14 RX ORDER — ROSUVASTATIN CALCIUM 10 MG/1
10 TABLET, COATED ORAL NIGHTLY
Qty: 90 TABLET | Refills: 2 | Status: SHIPPED | OUTPATIENT
Start: 2018-04-14 | End: 2018-10-29

## 2018-04-14 RX ORDER — IBUPROFEN 200 MG
200 TABLET ORAL EVERY 6 HOURS PRN
COMMUNITY
End: 2018-04-23

## 2018-04-14 NOTE — PROGRESS NOTES
Patient ID: Saulo Aguilar is a 63year old male. HPI  Patient presents with: Follow - Up  Lab Results    He is here for physical exam.  He smokes 1 pack per week. He is . He works at [employer] in [facility].     He takes 2 ibuprofen 03/04/2017         Lab Results  Component Value Date   GLU 98 03/03/2018   BUN 20 03/03/2018   BUNCREA 13.5 01/30/2017   CREATSERUM 0.94 03/03/2018   ANIONGAP 7 01/30/2017   GFRNAA 88 03/03/2018   GFRAA 102 03/03/2018   CA 9.7 03/03/2018   Dev 496 284 01/ [facility]    Lab Results  Component Value Date   PSA 0.6 01/30/2017   QPSA 1.2 03/03/2018   TOTPSASCREEN 0.6 10/10/2014           Wt Readings from Last 6 Encounters:  04/14/18 : 218 lb (98.9 kg)  12/09/17 : 210 lb (95.3 kg)  11/24/17 : 215 lb 9.6 oz (97.8 k History   Marital status:   Spouse name: N/A    Years of education: N/A  Number of children: N/A     Occupational History  None on file     Social History Main Topics   Smoking status: Current Every Day Smoker  1.00 Packs/day  For 25.00 Years     Ty Skin: Skin is warm and dry. No rash noted. Psychiatric: He has a normal mood and affect. Vitals reviewed.     Blood pressure 127/75, pulse 54, temperature (!) 97.5 °F (36.4 °C), temperature source Oral, height 5' 10\" (1.778 m), weight 218 lb (98.9 kg encounter. Follow up if symptoms persist.  Take medicine (if given) as prescribed. Approach to treatment discussed and patient/family member understands and agrees to plan. No Follow-up on file. Kevin Escobar,   4/14/2018      .

## 2018-04-23 ENCOUNTER — TELEPHONE (OUTPATIENT)
Dept: FAMILY MEDICINE CLINIC | Facility: CLINIC | Age: 63
End: 2018-04-23

## 2018-04-23 RX ORDER — PREDNISONE 20 MG/1
TABLET ORAL
Qty: 10 TABLET | Refills: 0 | Status: SHIPPED | OUTPATIENT
Start: 2018-04-23 | End: 2018-10-16

## 2018-04-23 NOTE — TELEPHONE ENCOUNTER
Received a call from patient who wanted to check on the status of his previous request. Patient made aware that the provider has not responded yet. Patient was advised to go to the ER or urgent care if his pain gets worse. Patient voiced understanding.  Mes

## 2018-04-23 NOTE — TELEPHONE ENCOUNTER
Pt called in requesting to have a new rx for MEDROL sent to his pharmacy. Pt states that he had a really bad sciatic nerve flair up this weekend.

## 2018-04-23 NOTE — TELEPHONE ENCOUNTER
Dr Adela Pang,    Pt c/o left sciatica pain that radiates to lower left leg. Pain scale varies 8 to 10. 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 Sitting relieves pain.   Did try ibuprofen and biofreeze cream prn with l

## 2018-04-24 ENCOUNTER — TELEPHONE (OUTPATIENT)
Dept: OTHER | Age: 63
End: 2018-04-24

## 2018-04-24 NOTE — TELEPHONE ENCOUNTER
Will need to be seen tomorrow am.  Please set up appt with one of the partners. No on the medrol dose pack.

## 2018-04-24 NOTE — TELEPHONE ENCOUNTER
I did send in the prednisone. He is to take 2 pills at the same time every day for 5 days with food. If his pain is not better in the next 4-5 days please have him see myself or another provider that has a sick day visits slot for him.

## 2018-04-27 ENCOUNTER — APPOINTMENT (OUTPATIENT)
Dept: LAB | Age: 63
End: 2018-04-27
Attending: FAMILY MEDICINE
Payer: COMMERCIAL

## 2018-04-27 ENCOUNTER — HOSPITAL ENCOUNTER (OUTPATIENT)
Dept: GENERAL RADIOLOGY | Age: 63
Discharge: HOME | End: 2018-04-27
Attending: FAMILY MEDICINE
Payer: COMMERCIAL

## 2018-04-27 DIAGNOSIS — E78.2 MIXED HYPERLIPIDEMIA: ICD-10-CM

## 2018-04-27 DIAGNOSIS — I10 ESSENTIAL HYPERTENSION, BENIGN: ICD-10-CM

## 2018-04-27 DIAGNOSIS — J98.4 PULMONARY LESION, RIGHT: ICD-10-CM

## 2018-04-27 PROCEDURE — 71046 X-RAY EXAM CHEST 2 VIEWS: CPT | Performed by: FAMILY MEDICINE

## 2018-04-27 PROCEDURE — 93010 ELECTROCARDIOGRAM REPORT: CPT | Performed by: FAMILY MEDICINE

## 2018-04-27 PROCEDURE — 93005 ELECTROCARDIOGRAM TRACING: CPT

## 2018-04-28 ENCOUNTER — TELEPHONE (OUTPATIENT)
Dept: FAMILY MEDICINE CLINIC | Facility: CLINIC | Age: 63
End: 2018-04-28

## 2018-04-28 NOTE — TELEPHONE ENCOUNTER
LMTCB     UPON PTS CALL BACK, PLEASE INFORM HIM OF MD MESSAGE BELOW IN REGARDS TO XR COMPLETED 4/27    Andres Antonio, DO  P [facility]             I am very happy to say that the right upper lobe is now clear.  It must of been inflammation or infec

## 2018-04-28 NOTE — TELEPHONE ENCOUNTER
Pt called to follow up on CXR and EKG results. Reviewed CXR results per doctor's instructions. Please advise EKG results.

## 2018-07-11 ENCOUNTER — NURSE TRIAGE (OUTPATIENT)
Dept: OTHER | Age: 63
End: 2018-07-11

## 2018-07-11 NOTE — TELEPHONE ENCOUNTER
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

## 2018-07-12 NOTE — TELEPHONE ENCOUNTER
Information from https://[place].nlm.nih.gov/text_version/chemicals. php?id=11      Long-term exposure to diesel exhaust can cause chronic respiratory symptoms such as persistent cough and mucous, bronchitis, and reduced lung capacity.  Long-term exposure to

## 2018-07-12 NOTE — TELEPHONE ENCOUNTER
Advised patient of Sukh Gloria note. Patient verbalized understanding. Appointment made for 7/16/18 at 5pm with Dr Jo-Ann Nunez in [address] [place] [address].

## 2018-08-13 ENCOUNTER — TELEPHONE (OUTPATIENT)
Dept: PULMONOLOGY | Facility: CLINIC | Age: 63
End: 2018-08-13

## 2018-08-13 ENCOUNTER — TELEPHONE (OUTPATIENT)
Dept: FAMILY MEDICINE CLINIC | Facility: CLINIC | Age: 63
End: 2018-08-13

## 2018-08-13 NOTE — TELEPHONE ENCOUNTER
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 Requesting to be seen for consult - also wants to know how he can get cpap download  Pls call. Thank you.

## 2018-08-13 NOTE — TELEPHONE ENCOUNTER
LMTCB to confirm appt for Monday 8/20 w/ Dr. Klaus Quintana at 12 pm [facility]. Also mentioned pt needs to call company CPAP machine came from to facilitate cpap download and ensure he or our office has a copy prior to the appointment Monday.

## 2018-08-14 NOTE — TELEPHONE ENCOUNTER
Spoke w/ pt he reports he was instructed by Dr. Nile Alicia to call and have our office \"just read his chip\" from his cpap so he can get DOT clearance.  Pt was explained that our clinic is not able to read most chips and that without being a patient we most de

## 2018-08-14 NOTE — TELEPHONE ENCOUNTER
LMTCB to confirm appt w/ Dr. Angel Sutton for Monday 8/20 @ 12 pm [facility]

## 2018-08-20 ENCOUNTER — OFFICE VISIT (OUTPATIENT)
Dept: OCCUPATIONAL MEDICINE | Age: 63
End: 2018-08-20
Attending: PHYSICIAN ASSISTANT

## 2018-08-23 ENCOUNTER — TELEPHONE (OUTPATIENT)
Dept: OTHER | Age: 63
End: 2018-08-23

## 2018-09-15 DIAGNOSIS — I10 ESSENTIAL HYPERTENSION, BENIGN: ICD-10-CM

## 2018-09-15 RX ORDER — HYDROCHLOROTHIAZIDE 12.5 MG/1
12.5 TABLET ORAL DAILY
Qty: 90 TABLET | Refills: 1 | OUTPATIENT
Start: 2018-09-15

## 2018-09-15 RX ORDER — LOSARTAN POTASSIUM 25 MG/1
25 TABLET ORAL DAILY
Qty: 90 TABLET | Refills: 1 | OUTPATIENT
Start: 2018-09-15

## 2018-10-16 ENCOUNTER — TELEPHONE (OUTPATIENT)
Dept: FAMILY MEDICINE CLINIC | Facility: CLINIC | Age: 63
End: 2018-10-16

## 2018-10-16 RX ORDER — PREDNISONE 20 MG/1
TABLET ORAL
Qty: 10 TABLET | Refills: 0 | Status: SHIPPED | OUTPATIENT
Start: 2018-10-16 | End: 2018-10-22

## 2018-10-16 NOTE — TELEPHONE ENCOUNTER
Dr Marilee Montero, please advise. Add to schedule today? Patient has sciatic flare-up. He painted room yesterday and was ok, or it's the weather, he said. Now pain lower back to right side, rated 7.  No pain to legs, no numbness/tingling/weakness of legs, no uri

## 2018-10-22 ENCOUNTER — OFFICE VISIT (OUTPATIENT)
Dept: FAMILY MEDICINE CLINIC | Facility: CLINIC | Age: 63
End: 2018-10-22
Payer: COMMERCIAL

## 2018-10-22 VITALS
HEIGHT: 70 IN | WEIGHT: 211.63 LBS | SYSTOLIC BLOOD PRESSURE: 128 MMHG | DIASTOLIC BLOOD PRESSURE: 73 MMHG | HEART RATE: 75 BPM | RESPIRATION RATE: 16 BRPM | BODY MASS INDEX: 30.3 KG/M2 | TEMPERATURE: 98 F

## 2018-10-22 DIAGNOSIS — I10 ESSENTIAL HYPERTENSION, BENIGN: ICD-10-CM

## 2018-10-22 DIAGNOSIS — E78.2 MIXED HYPERLIPIDEMIA: Primary | ICD-10-CM

## 2018-10-22 DIAGNOSIS — Z23 NEED FOR VACCINATION: ICD-10-CM

## 2018-10-22 PROCEDURE — 90686 IIV4 VACC NO PRSV 0.5 ML IM: CPT | Performed by: FAMILY MEDICINE

## 2018-10-22 PROCEDURE — 99212 OFFICE O/P EST SF 10 MIN: CPT | Performed by: FAMILY MEDICINE

## 2018-10-22 PROCEDURE — 90471 IMMUNIZATION ADMIN: CPT | Performed by: FAMILY MEDICINE

## 2018-10-22 PROCEDURE — 99214 OFFICE O/P EST MOD 30 MIN: CPT | Performed by: FAMILY MEDICINE

## 2018-10-22 RX ORDER — LOSARTAN POTASSIUM 25 MG/1
25 TABLET ORAL DAILY
Qty: 90 TABLET | Refills: 1 | Status: SHIPPED | OUTPATIENT
Start: 2018-10-22 | End: 2019-05-02

## 2018-10-22 RX ORDER — HYDROCHLOROTHIAZIDE 12.5 MG/1
12.5 TABLET ORAL DAILY
Qty: 90 TABLET | Refills: 1 | Status: SHIPPED | OUTPATIENT
Start: 2018-10-22 | End: 2019-05-02

## 2018-10-22 NOTE — PATIENT INSTRUCTIONS
You are up-to-date on your Tdap shot which is the whooping cough shot but also protect you against tetanus. You had that in September 2015.

## 2018-10-22 NOTE — PROGRESS NOTES
Patient ID: Howard Hwang is a 63year old male.     HPI  Patient presents with:  Hyperlipidemia  Hypertension    Notes Recorded by Laura Crouch DO on 3/12/2018 at 10:58 PM CDT  See me in the near future.  Your cholesterol remains high.  I think NPW 13.5 01/30/2017    CREATSERUM 0.94 03/03/2018    ANIONGAP 7 01/30/2017    GFRNAA 88 03/03/2018    GFRAA 102 03/03/2018    CA 9.7 03/03/2018    OSMOCALC 284 01/30/2017    ALKPHO 81 03/03/2018    AST 22 03/03/2018    ALT 29 03/03/2018    ALKPHOS 74 10/10/201 03/03/2018    TOTPSASCREEN 0.6 10/10/2014           Wt Readings from Last 6 Encounters:  10/22/18 : 211 lb 9.6 oz (96 kg)  04/14/18 : 218 lb (98.9 kg)  12/09/17 : 210 lb (95.3 kg)  11/24/17 : 215 lb 9.6 oz (97.8 kg)  06/01/17 : 205 lb (93 kg)  05/05/17 : 2 motion. Neck supple. No thyromegaly present. Lymphadenopathy:     Has  no cervical adenopathy. Cardiovascular: Normal rate, regular rhythm and no murmur heard. Pulmonary/Chest: Effort normal and breath sounds normal. No respiratory distress.    Neurolo

## 2018-10-29 ENCOUNTER — TELEPHONE (OUTPATIENT)
Dept: FAMILY MEDICINE CLINIC | Facility: CLINIC | Age: 63
End: 2018-10-29

## 2018-10-29 DIAGNOSIS — E78.2 MIXED HYPERLIPIDEMIA: ICD-10-CM

## 2018-10-29 RX ORDER — ROSUVASTATIN CALCIUM 10 MG/1
10 TABLET, COATED ORAL NIGHTLY
Qty: 90 TABLET | Refills: 1 | Status: SHIPPED | OUTPATIENT
Start: 2018-10-29 | End: 2019-05-02

## 2018-10-29 NOTE — TELEPHONE ENCOUNTER
Spoke with patient (identified name and ), results reviewed and agrees with plan.   rx for chol sent

## 2018-10-29 NOTE — TELEPHONE ENCOUNTER
----- Message from Zeenat Bernal DO sent at 10/29/2018  1:02 PM CDT -----  Continue cholesterol medications. Your liver tests and kidney tests are fine. Your cholesterol is much better than before.   Sugar is minimally high so work on portion sizes and a

## 2019-05-02 DIAGNOSIS — E78.2 MIXED HYPERLIPIDEMIA: ICD-10-CM

## 2019-05-02 DIAGNOSIS — I10 ESSENTIAL HYPERTENSION, BENIGN: ICD-10-CM

## 2019-05-02 RX ORDER — ROSUVASTATIN CALCIUM 10 MG/1
TABLET, COATED ORAL
Qty: 90 TABLET | Refills: 1 | Status: SHIPPED | OUTPATIENT
Start: 2019-05-02 | End: 2019-08-12 | Stop reason: CLARIF

## 2019-05-02 RX ORDER — HYDROCHLOROTHIAZIDE 12.5 MG/1
TABLET ORAL
Qty: 90 TABLET | Refills: 1 | Status: SHIPPED | OUTPATIENT
Start: 2019-05-02 | End: 2019-08-12 | Stop reason: CLARIF

## 2019-05-02 RX ORDER — LOSARTAN POTASSIUM 25 MG/1
TABLET ORAL
Qty: 90 TABLET | Refills: 1 | Status: SHIPPED | OUTPATIENT
Start: 2019-05-02 | End: 2019-08-12 | Stop reason: CLARIF

## 2019-05-25 ENCOUNTER — OFFICE VISIT (OUTPATIENT)
Dept: FAMILY MEDICINE CLINIC | Facility: CLINIC | Age: 64
End: 2019-05-25
Payer: COMMERCIAL

## 2019-05-25 VITALS
HEIGHT: 70 IN | DIASTOLIC BLOOD PRESSURE: 64 MMHG | BODY MASS INDEX: 30.58 KG/M2 | TEMPERATURE: 97 F | WEIGHT: 213.63 LBS | RESPIRATION RATE: 14 BRPM | SYSTOLIC BLOOD PRESSURE: 130 MMHG | HEART RATE: 66 BPM

## 2019-05-25 DIAGNOSIS — H93.11 TINNITUS OF RIGHT EAR: ICD-10-CM

## 2019-05-25 DIAGNOSIS — K63.5 POLYP OF COLON, UNSPECIFIED PART OF COLON, UNSPECIFIED TYPE: ICD-10-CM

## 2019-05-25 DIAGNOSIS — G47.33 OBSTRUCTIVE SLEEP APNEA SYNDROME: ICD-10-CM

## 2019-05-25 DIAGNOSIS — I10 ESSENTIAL HYPERTENSION, BENIGN: ICD-10-CM

## 2019-05-25 DIAGNOSIS — Z00.00 ADULT GENERAL MEDICAL EXAM: Primary | ICD-10-CM

## 2019-05-25 DIAGNOSIS — R06.2 WHEEZES: ICD-10-CM

## 2019-05-25 DIAGNOSIS — Z12.11 SCREENING FOR COLON CANCER: ICD-10-CM

## 2019-05-25 DIAGNOSIS — F17.200 TOBACCO USE DISORDER: ICD-10-CM

## 2019-05-25 DIAGNOSIS — E78.2 MIXED HYPERLIPIDEMIA: ICD-10-CM

## 2019-05-25 DIAGNOSIS — I87.2 VENOUS STASIS DERMATITIS OF BOTH LOWER EXTREMITIES: ICD-10-CM

## 2019-05-25 DIAGNOSIS — J30.89 OTHER ALLERGIC RHINITIS: ICD-10-CM

## 2019-05-25 DIAGNOSIS — J34.89 RHINORRHEA: ICD-10-CM

## 2019-05-25 DIAGNOSIS — H61.23 BILATERAL IMPACTED CERUMEN: ICD-10-CM

## 2019-05-25 PROCEDURE — 99214 OFFICE O/P EST MOD 30 MIN: CPT | Performed by: FAMILY MEDICINE

## 2019-05-25 PROCEDURE — 99212 OFFICE O/P EST SF 10 MIN: CPT | Performed by: FAMILY MEDICINE

## 2019-05-25 PROCEDURE — 99396 PREV VISIT EST AGE 40-64: CPT | Performed by: FAMILY MEDICINE

## 2019-05-25 PROCEDURE — 99406 BEHAV CHNG SMOKING 3-10 MIN: CPT | Performed by: FAMILY MEDICINE

## 2019-05-25 RX ORDER — VARENICLINE TARTRATE 1 MG/1
1 TABLET, FILM COATED ORAL 2 TIMES DAILY
Qty: 1 PACKAGE | Refills: 1 | Status: SHIPPED | OUTPATIENT
Start: 2019-05-25 | End: 2020-02-22

## 2019-05-25 RX ORDER — MONTELUKAST SODIUM 10 MG/1
10 TABLET ORAL NIGHTLY
Qty: 90 TABLET | Refills: 1 | Status: SHIPPED | OUTPATIENT
Start: 2019-05-25 | End: 2019-09-07

## 2019-05-25 NOTE — PROGRESS NOTES
Patient ID: Stanley Huerta is a 66year old male. HPI  Patient presents with:  Hyperlipidemia  Hypertension   He smokes half a pack every couple days. He is open to trying Chantix because a friend told him it worked for them.  Has tried vaping to [employer] due on 12/22/1976  FIT Colorectal Screening due on 12/22/2007  Annual Depression Screen due on 10/12/2016  Tobacco Cessation Counseling 2 Years due on 08/22/2017  Annual Physical due on 04/14/2019  PSA due on 03/03/2020  Influenza Vaccine Completed    ====  10/26/2018    CO2 29 10/26/2018    OSMOCALC 284 01/30/2017       No results found for: Aline Ip, [address], [address], [address], [address], [address], [address] [facility], [address] [facility], Replaced by [facility] [address], [address], [address], [address] [facility], [address], [facility], [place] [address], WBCUR, RBCUR, Lenoratravon Fraser Negative for hearing loss and voice change. Respiratory: Negative for chest tightness and shortness of breath. Cardiovascular: Negative for chest pain. Gastrointestinal: Negative for abdominal pain and blood in stool.    Genitourinary: Negative for Stress: Not on file    Relationships      Social connections:        Talks on phone: Not on file        Gets together: Not on file        Attends Buddhist service: Not on file        Active member of club or organization: Not on file        Attends meetin Exam  Physical Exam   Constitutional: He appears well-developed and well-nourished. No distress. HENT:   Head: Normocephalic. Ears bilaterally: Cerumen impaction bilaterally. TM not visible. Nose: Pale boggy nasal mucosa with clear rhinorrhea.   Woodrow Bowen told him how to start the medicine and do the quit date. Essential hypertension, benign  -     XR CHEST PA + LAT CHEST (CPT=71046); Future    Mixed hyperlipidemia  Continue cholesterol medicine .   Tobacco use disorder  -     Varenicline Tartrate (CHANTIX (CPT=71046); Future    Venous stasis dermatitis of both lower extremities  He has seen a vein doctor in the past.  Smoking cessation is key. Keep active.       Referrals (if applicable)  Orders Placed This Encounter      Gastro Referral - Christiano Mcnamara

## 2019-06-08 ENCOUNTER — OFFICE VISIT (OUTPATIENT)
Dept: OTOLARYNGOLOGY | Facility: CLINIC | Age: 64
End: 2019-06-08
Payer: COMMERCIAL

## 2019-06-08 ENCOUNTER — OFFICE VISIT (OUTPATIENT)
Dept: AUDIOLOGY | Facility: CLINIC | Age: 64
End: 2019-06-08
Payer: COMMERCIAL

## 2019-06-08 VITALS
HEIGHT: 70 IN | HEART RATE: 58 BPM | DIASTOLIC BLOOD PRESSURE: 76 MMHG | WEIGHT: 200 LBS | TEMPERATURE: 97 F | SYSTOLIC BLOOD PRESSURE: 142 MMHG | BODY MASS INDEX: 28.63 KG/M2

## 2019-06-08 DIAGNOSIS — H93.11 TINNITUS, RIGHT: Primary | ICD-10-CM

## 2019-06-08 DIAGNOSIS — H93.11 RINGING IN THE EAR, RIGHT: Primary | ICD-10-CM

## 2019-06-08 PROCEDURE — 99212 OFFICE O/P EST SF 10 MIN: CPT | Performed by: OTOLARYNGOLOGY

## 2019-06-08 PROCEDURE — 92557 COMPREHENSIVE HEARING TEST: CPT | Performed by: AUDIOLOGIST

## 2019-06-08 PROCEDURE — 92567 TYMPANOMETRY: CPT | Performed by: AUDIOLOGIST

## 2019-06-08 PROCEDURE — 99203 OFFICE O/P NEW LOW 30 MIN: CPT | Performed by: OTOLARYNGOLOGY

## 2019-06-08 RX ORDER — AZELASTINE 1 MG/ML
2 SPRAY, METERED NASAL 2 TIMES DAILY
Qty: 1 BOTTLE | Refills: 0 | Status: SHIPPED | OUTPATIENT
Start: 2019-06-08 | End: 2019-06-30

## 2019-06-08 RX ORDER — AMOXICILLIN AND CLAVULANATE POTASSIUM 500; 125 MG/1; MG/1
TABLET, FILM COATED ORAL
COMMUNITY
Start: 2019-06-07 | End: 2019-09-07

## 2019-06-08 RX ORDER — LORATADINE 10 MG/1
10 TABLET ORAL DAILY
Qty: 30 TABLET | Refills: 3 | Status: SHIPPED | OUTPATIENT
Start: 2019-06-08 | End: 2019-09-07

## 2019-06-08 RX ORDER — TRAMADOL HYDROCHLORIDE 50 MG/1
TABLET ORAL
COMMUNITY
Start: 2019-06-07 | End: 2020-02-22

## 2019-06-08 RX ORDER — MONTELUKAST SODIUM 10 MG/1
10 TABLET ORAL NIGHTLY
Qty: 30 TABLET | Refills: 3 | Status: SHIPPED | OUTPATIENT
Start: 2019-06-08 | End: 2019-09-07

## 2019-06-08 NOTE — PROGRESS NOTES
AUDIOGRAM     Sujatha Wells was referred for testing by No ref. provider found. 12/22/1955  [id number]      Otoscopic Inspection:  both ears: no cerumen    Audiometric Test Results: The patient was tested using air and bone audiometry.   [facility] [employer]

## 2019-06-22 NOTE — PROGRESS NOTES
Emelie Blizzard is a 66year old male.   Patient presents with:  Ear Wax: bilateral ear cleaning   Ear Problem: ringing in the right ear for a couple of months       HISTORY OF PRESENT ILLNESS  He presents with a complaint of right sided tinnitus which is and vision changes. Respiratory Negative Dyspnea and wheezing. Cardio Negative Chest pain, irregular heartbeat/palpitations and syncope. GI Negative Abdominal pain and diarrhea. Endocrine Negative Cold intolerance and heat intolerance.    Neuro Nega tablet (10 mg total) by mouth nightly., Disp: 30 tablet, Rfl: 3  •  loratadine 10 MG Oral Tab, Take 1 tablet (10 mg total) by mouth daily. , Disp: 30 tablet, Rfl: 3  •  Azelastine HCl 0.1 % Nasal Solution, 2 sprays by Nasal route 2 (two) times daily. , Disp:

## 2019-07-01 RX ORDER — AZELASTINE HYDROCHLORIDE 137 UG/1
SPRAY, METERED NASAL
Qty: 1 BOTTLE | Refills: 0 | Status: SHIPPED | OUTPATIENT
Start: 2019-07-01 | End: 2019-09-07

## 2019-08-10 DIAGNOSIS — E78.2 MIXED HYPERLIPIDEMIA: ICD-10-CM

## 2019-08-10 DIAGNOSIS — I10 ESSENTIAL HYPERTENSION, BENIGN: ICD-10-CM

## 2019-08-12 RX ORDER — LOSARTAN POTASSIUM 25 MG/1
TABLET ORAL
Qty: 90 TABLET | Refills: 0 | Status: SHIPPED | OUTPATIENT
Start: 2019-08-12 | End: 2019-11-06

## 2019-08-12 RX ORDER — HYDROCHLOROTHIAZIDE 12.5 MG/1
TABLET ORAL
Qty: 90 TABLET | Refills: 0 | Status: SHIPPED | OUTPATIENT
Start: 2019-08-12 | End: 2019-11-06

## 2019-08-12 RX ORDER — ROSUVASTATIN CALCIUM 10 MG/1
TABLET, COATED ORAL
Qty: 90 TABLET | Refills: 0 | Status: SHIPPED | OUTPATIENT
Start: 2019-08-12 | End: 2019-11-06

## 2019-08-12 NOTE — TELEPHONE ENCOUNTER
Followed up with [facility], pt should have refill available on file.  Per pharmacist pt has been filling 30 day supply, does have 60 day supply left on file however insurance now requiring a 90 day fill, pt unable to fill 60 days only, requesting refill

## 2019-09-07 ENCOUNTER — OFFICE VISIT (OUTPATIENT)
Dept: FAMILY MEDICINE CLINIC | Facility: CLINIC | Age: 64
End: 2019-09-07
Payer: COMMERCIAL

## 2019-09-07 VITALS
WEIGHT: 213 LBS | BODY MASS INDEX: 30.49 KG/M2 | HEART RATE: 72 BPM | SYSTOLIC BLOOD PRESSURE: 128 MMHG | HEIGHT: 70 IN | DIASTOLIC BLOOD PRESSURE: 65 MMHG | TEMPERATURE: 98 F

## 2019-09-07 DIAGNOSIS — R05.9 COUGH: ICD-10-CM

## 2019-09-07 DIAGNOSIS — J06.9 ACUTE URI: ICD-10-CM

## 2019-09-07 PROCEDURE — 99213 OFFICE O/P EST LOW 20 MIN: CPT | Performed by: FAMILY MEDICINE

## 2019-09-07 RX ORDER — AZITHROMYCIN 250 MG/1
TABLET, FILM COATED ORAL
Qty: 6 TABLET | Refills: 0 | Status: SHIPPED | OUTPATIENT
Start: 2019-09-07 | End: 2020-02-22

## 2019-09-07 RX ORDER — ALBUTEROL SULFATE 90 UG/1
2 AEROSOL, METERED RESPIRATORY (INHALATION) EVERY 4 HOURS PRN
Qty: 1 INHALER | Refills: 0 | Status: SHIPPED | OUTPATIENT
Start: 2019-09-07 | End: 2020-02-22

## 2019-09-07 NOTE — PROGRESS NOTES
HPI:    Patient ID: Stanley Huerta is a 66year old male. Pt presents with cold symptoms for 2 weeks. Pt was seen at an immediate care and was started on an inhaler, amoxicillin and tessalon and is better but having some residual cough. No fevers.  P respiratory distress. He has no wheezes. He has no rales. Lymphadenopathy:     He has no cervical adenopathy. Vitals reviewed.              ASSESSMENT/PLAN:   Acute uri/ Cough:  - After discussion with patient, zithromax as directed; albuterol MDI as ne

## 2019-11-06 DIAGNOSIS — I10 ESSENTIAL HYPERTENSION, BENIGN: ICD-10-CM

## 2019-11-06 DIAGNOSIS — E78.2 MIXED HYPERLIPIDEMIA: ICD-10-CM

## 2019-11-07 RX ORDER — LOSARTAN POTASSIUM 25 MG/1
TABLET ORAL
Qty: 90 TABLET | Refills: 0 | Status: SHIPPED | OUTPATIENT
Start: 2019-11-07 | End: 2020-02-02

## 2019-11-07 RX ORDER — ROSUVASTATIN CALCIUM 10 MG/1
TABLET, COATED ORAL
Qty: 90 TABLET | Refills: 0 | Status: SHIPPED | OUTPATIENT
Start: 2019-11-07 | End: 2020-02-03

## 2019-11-07 RX ORDER — HYDROCHLOROTHIAZIDE 12.5 MG/1
TABLET ORAL
Qty: 90 TABLET | Refills: 0 | Status: SHIPPED | OUTPATIENT
Start: 2019-11-07 | End: 2020-02-02

## 2019-11-18 ENCOUNTER — DOCUMENTATION ONLY (OUTPATIENT)
Dept: FAMILY MEDICINE CLINIC | Facility: CLINIC | Age: 64
End: 2019-11-18

## 2020-02-01 DIAGNOSIS — I10 ESSENTIAL HYPERTENSION, BENIGN: ICD-10-CM

## 2020-02-01 DIAGNOSIS — E78.2 MIXED HYPERLIPIDEMIA: ICD-10-CM

## 2020-02-03 RX ORDER — ROSUVASTATIN CALCIUM 10 MG/1
TABLET, COATED ORAL
Qty: 90 TABLET | Refills: 0 | Status: SHIPPED | OUTPATIENT
Start: 2020-02-03 | End: 2020-02-22

## 2020-02-03 RX ORDER — HYDROCHLOROTHIAZIDE 12.5 MG/1
TABLET ORAL
Qty: 90 TABLET | Refills: 0 | Status: SHIPPED | OUTPATIENT
Start: 2020-02-03 | End: 2020-02-22

## 2020-02-03 RX ORDER — LOSARTAN POTASSIUM 25 MG/1
TABLET ORAL
Qty: 90 TABLET | Refills: 0 | Status: SHIPPED | OUTPATIENT
Start: 2020-02-03 | End: 2020-02-22

## 2020-02-03 NOTE — TELEPHONE ENCOUNTER
Refilled times 1 but needs appointment before the next prescription will be filled. Please call patient and set up an office visit as overdue. Needs to be seen for follow-up complaint visit as he has high blood pressure and high cholesterol.

## 2020-02-08 ENCOUNTER — TELEPHONE (OUTPATIENT)
Dept: FAMILY MEDICINE CLINIC | Facility: CLINIC | Age: 65
End: 2020-02-08

## 2020-02-08 DIAGNOSIS — Z00.00 ADULT GENERAL MEDICAL EXAM: Primary | ICD-10-CM

## 2020-02-08 NOTE — TELEPHONE ENCOUNTER
Patient needs labs that were ordered in 05/2019 to be ordered through [address] [place] [id number], Dr Jcarlos Villanueva please advise.

## 2020-02-10 LAB
ABSOLUTE BASOPHILS: 81 CELLS/UL (ref 0–200)
ABSOLUTE EOSINOPHILS: 394 CELLS/UL (ref 15–500)
ABSOLUTE LYMPHOCYTES: 1737 CELLS/UL (ref 850–3900)
ABSOLUTE MONOCYTES: 939 CELLS/UL (ref 200–950)
ABSOLUTE NEUTROPHILS: 6949 CELLS/UL (ref 1500–7800)
ALBUMIN/GLOBULIN RATIO: 1.9 (CALC) (ref 1–2.5)
ALBUMIN: 4.1 G/DL (ref 3.6–5.1)
ALKALINE PHOSPHATASE: 73 U/L (ref 35–144)
ALT: 32 U/L (ref 9–46)
AST: 29 U/L (ref 10–35)
BASOPHILS: 0.8 %
BILIRUBIN, TOTAL: 0.4 MG/DL (ref 0.2–1.2)
BUN/CREATININE RATIO: 23 (CALC) (ref 6–22)
BUN: 26 MG/DL (ref 7–25)
CALCIUM: 9.4 MG/DL (ref 8.6–10.3)
CARBON DIOXIDE: 26 MMOL/L (ref 20–32)
CHLORIDE: 110 MMOL/L (ref 98–110)
CHOL/HDLC RATIO: 3.5 (CALC)
CHOLESTEROL, TOTAL: 166 MG/DL
CREATININE: 1.11 MG/DL (ref 0.7–1.25)
EGFR IF AFRICN AM: 82 ML/MIN/1.73M2
EGFR IF NONAFRICN AM: 71 ML/MIN/1.73M2
EOSINOPHILS: 3.9 %
GLOBULIN: 2.2 G/DL (CALC) (ref 1.9–3.7)
GLUCOSE: 102 MG/DL (ref 65–99)
HDL CHOLESTEROL: 47 MG/DL
HEMATOCRIT: 48.3 % (ref 38.5–50)
HEMOGLOBIN: 16.5 G/DL (ref 13.2–17.1)
LDL-CHOLESTEROL: 99 MG/DL (CALC)
LYMPHOCYTES: 17.2 %
MCH: 33 PG (ref 27–33)
MCHC: 34.2 G/DL (ref 32–36)
MCV: 96.6 FL (ref 80–100)
MONOCYTES: 9.3 %
MPV: 10.2 FL (ref 7.5–12.5)
NEUTROPHILS: 68.8 %
NON-HDL CHOLESTEROL: 119 MG/DL (CALC)
PLATELET COUNT: 259 THOUSAND/UL (ref 140–400)
POTASSIUM: 4.7 MMOL/L (ref 3.5–5.3)
PROTEIN, TOTAL: 6.3 G/DL (ref 6.1–8.1)
RDW: 13.3 % (ref 11–15)
RED BLOOD CELL COUNT: 5 MILLION/UL (ref 4.2–5.8)
SODIUM: 142 MMOL/L (ref 135–146)
TOTAL PSA: 0.7 NG/ML
TRIGLYCERIDES: 105 MG/DL
TSH W/REFLEX TO FT4: 1 MIU/L (ref 0.4–4.5)
WHITE BLOOD CELL COUNT: 10.1 THOUSAND/UL (ref 3.8–10.8)

## 2020-02-22 ENCOUNTER — OFFICE VISIT (OUTPATIENT)
Dept: FAMILY MEDICINE CLINIC | Facility: CLINIC | Age: 65
End: 2020-02-22
Payer: COMMERCIAL

## 2020-02-22 VITALS
TEMPERATURE: 99 F | BODY MASS INDEX: 31.69 KG/M2 | DIASTOLIC BLOOD PRESSURE: 72 MMHG | SYSTOLIC BLOOD PRESSURE: 124 MMHG | HEIGHT: 70 IN | HEART RATE: 66 BPM | WEIGHT: 221.38 LBS

## 2020-02-22 DIAGNOSIS — I10 ESSENTIAL HYPERTENSION, BENIGN: ICD-10-CM

## 2020-02-22 DIAGNOSIS — Z00.00 ADULT GENERAL MEDICAL EXAM: Primary | ICD-10-CM

## 2020-02-22 DIAGNOSIS — E78.2 MIXED HYPERLIPIDEMIA: ICD-10-CM

## 2020-02-22 DIAGNOSIS — F17.210 CIGARETTE SMOKER: ICD-10-CM

## 2020-02-22 DIAGNOSIS — K63.5 POLYP OF COLON, UNSPECIFIED PART OF COLON, UNSPECIFIED TYPE: ICD-10-CM

## 2020-02-22 DIAGNOSIS — Z12.11 SCREENING FOR COLON CANCER: ICD-10-CM

## 2020-02-22 DIAGNOSIS — F17.200 TOBACCO USE DISORDER: ICD-10-CM

## 2020-02-22 DIAGNOSIS — G47.33 OBSTRUCTIVE SLEEP APNEA SYNDROME: ICD-10-CM

## 2020-02-22 PROCEDURE — 99406 BEHAV CHNG SMOKING 3-10 MIN: CPT | Performed by: FAMILY MEDICINE

## 2020-02-22 PROCEDURE — 99396 PREV VISIT EST AGE 40-64: CPT | Performed by: FAMILY MEDICINE

## 2020-02-22 RX ORDER — LOSARTAN POTASSIUM 25 MG/1
25 TABLET ORAL
Qty: 90 TABLET | Refills: 1 | Status: SHIPPED | OUTPATIENT
Start: 2020-02-22 | End: 2020-12-08

## 2020-02-22 RX ORDER — HYDROCHLOROTHIAZIDE 12.5 MG/1
12.5 TABLET ORAL
Qty: 90 TABLET | Refills: 1 | Status: SHIPPED | OUTPATIENT
Start: 2020-02-22 | End: 2020-11-27

## 2020-02-22 RX ORDER — ROSUVASTATIN CALCIUM 10 MG/1
TABLET, COATED ORAL
Qty: 90 TABLET | Refills: 1 | Status: SHIPPED | OUTPATIENT
Start: 2020-02-22 | End: 2021-03-02

## 2020-02-22 NOTE — PROGRESS NOTES
Tobacco Cessation Documentation (Smoking and Smokeless included): I had an in depth therapy session with Basil Roxann about his tobacco use risks and options using the USPSTF's Five A's approach:    Ask: Sharon Ritter is using tobacco products.   Assess:

## 2020-02-22 NOTE — PROGRESS NOTES
Patient ID: Karthik Barksdale is a 60year old male. HPI  Patient presents with:  Test Results: follow up    Overdue for a physical so we will make this a physical today. He never did his labs last year.   He had them done prior to today's visit so we 02/08/2020    MCHC 34.2 02/08/2020    RDW 13.3 02/08/2020    NEUTABS 6,949 02/08/2020    LYMPHABS 1,737 02/08/2020    EOSABS 394 02/08/2020    BASABS 81 02/08/2020    NEUT 68.8 02/08/2020    LYMPH 17.2 02/08/2020    MON 9.3 02/08/2020    EOS 3.9 02/08/2020 01/30/2017    QPSA 1.2 03/03/2018    TOTPSASCREEN 0.6 10/10/2014       =======================================================    Wt Readings from Last 6 Encounters:  02/22/20 : 221 lb 6.4 oz (100.4 kg)  09/07/19 : 213 lb (96.6 kg)  06/08/19 : 200 lb (90.7 Number of children: Not on file      Years of education: Not on file      Highest education level: Not on file    Occupational History      Not on file    Social Needs      Financial resource strain: Not on file      Food insecurity:        Worry: Not on f spends a great deal of time in the sun: Not Asked        Past Sunlamp Treatments for Acne: Not Asked        History of tanning: Not Asked        Hx of Spending [place] of Time in Sun: Not Asked        Bad sunburns in the past: Not Asked        Tanning S Vitals reviewed. Blood pressure 148/80, pulse 66, temperature 98.5 °F (36.9 °C), temperature source Oral, height 5' 10\" (1.778 m), weight 221 lb 6.4 oz (100.4 kg).    02/22/20  1045 02/22/20  1136   BP: 148/80 124/72   Pulse: 66    Temp: 98.5 °F (36 have set a goal to improve your health. Quit Smoking Resources    In addition to medications, use the STAR plan to help you successfully quit. · Stick with your quit date! · Tell friends, family, and coworkers your quit date.  Request their Prudy Melissa

## 2020-08-18 ENCOUNTER — TELEPHONE (OUTPATIENT)
Dept: FAMILY MEDICINE CLINIC | Facility: CLINIC | Age: 65
End: 2020-08-18

## 2020-08-20 NOTE — TELEPHONE ENCOUNTER
Name:BERLIN 8/19/2020 8:06:02 AM  ProfileId:  PagerID [phone]  Department:[place] ANSWERING SERVICE  ======================================================================  Paging    Message #  08/18/2020 07:08p [[id number]]  To:  From: Efraín Bojorquez

## 2020-08-20 NOTE — TELEPHONE ENCOUNTER
Called complaining of sciatic no injury. No leg weakness no bowel or bladder dysfunction no trauma. No diabetes. Medrol dosepack sent patient to fu with PCP Paco.

## 2020-08-24 ENCOUNTER — TELEPHONE (OUTPATIENT)
Dept: FAMILY MEDICINE CLINIC | Facility: CLINIC | Age: 65
End: 2020-08-24

## 2020-08-24 RX ORDER — METHOCARBAMOL 750 MG/1
750 TABLET, FILM COATED ORAL 3 TIMES DAILY PRN
Qty: 30 TABLET | Refills: 0 | Status: SHIPPED | OUTPATIENT
Start: 2020-08-24 | End: 2020-08-31

## 2020-08-24 NOTE — TELEPHONE ENCOUNTER
Patient called to follow up on earlier call    States he is currently driving to [place], [place] and will return back to [place] later today    Patient is requesting medication for his right sciatica pain. Please advise.

## 2020-08-24 NOTE — TELEPHONE ENCOUNTER
It was Robaxin. I sent that in.  1 pill 3 times daily as needed. Do not drive with that though as it can make people tired as it is a muscle relaxant.

## 2020-08-24 NOTE — TELEPHONE ENCOUNTER
Patient had on call phone call on 8/18/20 from dr Javi Jones for increased right sciatica pain. Prescribed Medrol dose pack. Calling back after finishing the Medrol dose pack.  Patient is currently driving in [facility] and stated, \"this happened abou

## 2020-08-29 ENCOUNTER — OFFICE VISIT (OUTPATIENT)
Dept: FAMILY MEDICINE CLINIC | Facility: CLINIC | Age: 65
End: 2020-08-29
Payer: COMMERCIAL

## 2020-08-29 ENCOUNTER — TELEPHONE (OUTPATIENT)
Dept: FAMILY MEDICINE CLINIC | Facility: CLINIC | Age: 65
End: 2020-08-29

## 2020-08-29 VITALS
TEMPERATURE: 97 F | HEIGHT: 70 IN | WEIGHT: 206.81 LBS | HEART RATE: 69 BPM | SYSTOLIC BLOOD PRESSURE: 124 MMHG | DIASTOLIC BLOOD PRESSURE: 68 MMHG | BODY MASS INDEX: 29.61 KG/M2

## 2020-08-29 DIAGNOSIS — M54.42 ACUTE BILATERAL LOW BACK PAIN WITH BILATERAL SCIATICA: Primary | ICD-10-CM

## 2020-08-29 DIAGNOSIS — M54.41 ACUTE BILATERAL LOW BACK PAIN WITH BILATERAL SCIATICA: Primary | ICD-10-CM

## 2020-08-29 PROCEDURE — 3008F BODY MASS INDEX DOCD: CPT | Performed by: FAMILY MEDICINE

## 2020-08-29 PROCEDURE — 99214 OFFICE O/P EST MOD 30 MIN: CPT | Performed by: FAMILY MEDICINE

## 2020-08-29 PROCEDURE — 3074F SYST BP LT 130 MM HG: CPT | Performed by: FAMILY MEDICINE

## 2020-08-29 PROCEDURE — 3078F DIAST BP <80 MM HG: CPT | Performed by: FAMILY MEDICINE

## 2020-08-29 RX ORDER — METHYLPREDNISOLONE 4 MG/1
TABLET ORAL
COMMUNITY
Start: 2020-08-18 | End: 2020-08-29

## 2020-08-29 RX ORDER — TRAMADOL HYDROCHLORIDE 50 MG/1
50 TABLET ORAL EVERY 8 HOURS PRN
Qty: 30 TABLET | Refills: 0 | Status: SHIPPED | OUTPATIENT
Start: 2020-08-29 | End: 2020-08-31

## 2020-08-29 NOTE — TELEPHONE ENCOUNTER
Patient following up for further treatment, reports has been taking Robaxin but no relief in pain, also reports took medrol dosepak but helped 1-2 days only.  Reports pain has been worsening to right lower back/buttock radiating down leg, reports difficulty

## 2020-08-29 NOTE — TELEPHONE ENCOUNTER
Pt requesting an appt to see Dr. Holli Morocho on a Saturday or weekday after 5:00pm   As soon as possible   He needs to f/u on back pain.  See encounter of 08/29/20

## 2020-08-29 NOTE — PROGRESS NOTES
8/29/2020  12:23 PM    Ion Angela is a 60year old male. Chief complaint(s): Patient presents with:  Back Pain: bilateral buttock    HPI:     Ion Angela primary complaint is regarding as above.      Ion Angela is a 60year old male p ([id number])                          10/22/2018      Influenza             11/17/2019      Pneumovax 23          11/17/2019      TDAP                  09/17/2015    Deferred                Date(s) Deferred    TDAP                  11/24/2017      Medications ( Assessment   Acute bilateral low back pain with bilateral sciatica  (primary encounter diagnosis)    MEDICATIONS:     Requested Prescriptions     Signed Prescriptions Disp Refills   • traMADol HCl 50 MG Oral Tab 30 tablet 0     Sig: Take 1 tablet (50 mg

## 2020-08-31 ENCOUNTER — HOSPITAL ENCOUNTER (OUTPATIENT)
Dept: GENERAL RADIOLOGY | Age: 65
Discharge: HOME | End: 2020-08-31
Attending: FAMILY MEDICINE
Payer: COMMERCIAL

## 2020-08-31 ENCOUNTER — OFFICE VISIT (OUTPATIENT)
Dept: FAMILY MEDICINE CLINIC | Facility: CLINIC | Age: 65
End: 2020-08-31
Payer: COMMERCIAL

## 2020-08-31 VITALS
DIASTOLIC BLOOD PRESSURE: 83 MMHG | HEIGHT: 70 IN | BODY MASS INDEX: 29.49 KG/M2 | HEART RATE: 69 BPM | SYSTOLIC BLOOD PRESSURE: 161 MMHG | WEIGHT: 206 LBS

## 2020-08-31 DIAGNOSIS — R20.0 NUMBNESS IN RIGHT LEG: ICD-10-CM

## 2020-08-31 DIAGNOSIS — M54.41 CHRONIC RIGHT-SIDED LOW BACK PAIN WITH RIGHT-SIDED SCIATICA: Primary | ICD-10-CM

## 2020-08-31 DIAGNOSIS — G89.29 CHRONIC RIGHT-SIDED LOW BACK PAIN WITH RIGHT-SIDED SCIATICA: ICD-10-CM

## 2020-08-31 DIAGNOSIS — R26.89 ANTALGIC GAIT: ICD-10-CM

## 2020-08-31 DIAGNOSIS — G89.29 CHRONIC RIGHT-SIDED LOW BACK PAIN WITH RIGHT-SIDED SCIATICA: Primary | ICD-10-CM

## 2020-08-31 DIAGNOSIS — M54.41 CHRONIC RIGHT-SIDED LOW BACK PAIN WITH RIGHT-SIDED SCIATICA: ICD-10-CM

## 2020-08-31 PROCEDURE — 99214 OFFICE O/P EST MOD 30 MIN: CPT | Performed by: FAMILY MEDICINE

## 2020-08-31 PROCEDURE — 3008F BODY MASS INDEX DOCD: CPT | Performed by: FAMILY MEDICINE

## 2020-08-31 PROCEDURE — 3077F SYST BP >= 140 MM HG: CPT | Performed by: FAMILY MEDICINE

## 2020-08-31 PROCEDURE — 72110 X-RAY EXAM L-2 SPINE 4/>VWS: CPT | Performed by: FAMILY MEDICINE

## 2020-08-31 PROCEDURE — 3079F DIAST BP 80-89 MM HG: CPT | Performed by: FAMILY MEDICINE

## 2020-08-31 RX ORDER — DIAZEPAM 5 MG/1
5 TABLET ORAL NIGHTLY PRN
Qty: 15 TABLET | Refills: 0 | Status: ON HOLD | OUTPATIENT
Start: 2020-08-31 | End: 2020-09-16

## 2020-08-31 RX ORDER — PREDNISONE 20 MG/1
TABLET ORAL
Qty: 18 TABLET | Refills: 0 | Status: ON HOLD | OUTPATIENT
Start: 2020-08-31 | End: 2020-09-16

## 2020-08-31 NOTE — PROGRESS NOTES
Patient ID: Alex Perry is a 60year old male. HPI  Patient presents with:  Back Pain: right side pain    Patient is having pain walking.   Since Saturday now he can't even extend his right knee and then dorsiflex his right foot without having num 28.70 kg/m²  05/25/19 : 30.65 kg/m²      BP Readings from Last 6 Encounters:  08/31/20 : (!) 161/83  08/29/20 : 124/68  02/22/20 : 124/72  09/07/19 : 128/65  06/08/19 : 142/76  05/25/19 : 130/64        Review of Systems        Medical History:      Past Me exam was intact, good strength with plantar flexion and dorsiflexion, gait is antalgic. Able to flex forward at the waist and touch the ankles but is very slow doing this. No bony tenderness.     Straight leg raise is positive on the right at 30 degrees a

## 2020-09-01 ENCOUNTER — TELEPHONE (OUTPATIENT)
Dept: FAMILY MEDICINE CLINIC | Facility: CLINIC | Age: 65
End: 2020-09-01

## 2020-09-01 DIAGNOSIS — M54.42 ACUTE BILATERAL LOW BACK PAIN WITH BILATERAL SCIATICA: ICD-10-CM

## 2020-09-01 DIAGNOSIS — R26.89 ANTALGIC GAIT: ICD-10-CM

## 2020-09-01 DIAGNOSIS — M54.41 ACUTE BILATERAL LOW BACK PAIN WITH BILATERAL SCIATICA: ICD-10-CM

## 2020-09-01 DIAGNOSIS — R20.0 NUMBNESS IN RIGHT LEG: ICD-10-CM

## 2020-09-01 DIAGNOSIS — M54.41 CHRONIC RIGHT-SIDED LOW BACK PAIN WITH RIGHT-SIDED SCIATICA: Primary | ICD-10-CM

## 2020-09-01 DIAGNOSIS — G89.29 CHRONIC RIGHT-SIDED LOW BACK PAIN WITH RIGHT-SIDED SCIATICA: Primary | ICD-10-CM

## 2020-09-02 NOTE — TELEPHONE ENCOUNTER
Patient called and states that he is currently taking Prednisone and Valium for back pain since yesterday. The pain is getting better a little bit. Patient wants injection on the back. Advise patient continue with Prednisone and Valium.  Call back 2-3 days

## 2020-09-03 ENCOUNTER — TELEPHONE (OUTPATIENT)
Dept: NEUROLOGY | Facility: CLINIC | Age: 65
End: 2020-09-03

## 2020-09-03 ENCOUNTER — TELEPHONE (OUTPATIENT)
Dept: PHYSICAL THERAPY | Age: 65
End: 2020-09-03

## 2020-09-03 ENCOUNTER — OFFICE VISIT (OUTPATIENT)
Dept: NEUROLOGY | Facility: CLINIC | Age: 65
End: 2020-09-03
Payer: COMMERCIAL

## 2020-09-03 VITALS — WEIGHT: 206 LBS | BODY MASS INDEX: 29.49 KG/M2 | HEIGHT: 70 IN

## 2020-09-03 DIAGNOSIS — G47.9 SLEEP DISTURBANCE: ICD-10-CM

## 2020-09-03 DIAGNOSIS — M54.16 ACUTE RIGHT LUMBAR RADICULOPATHY: Primary | ICD-10-CM

## 2020-09-03 DIAGNOSIS — R29.3 POOR POSTURE: ICD-10-CM

## 2020-09-03 PROCEDURE — 99244 OFF/OP CNSLTJ NEW/EST MOD 40: CPT | Performed by: PHYSICAL MEDICINE & REHABILITATION

## 2020-09-03 PROCEDURE — 3008F BODY MASS INDEX DOCD: CPT | Performed by: PHYSICAL MEDICINE & REHABILITATION

## 2020-09-03 NOTE — TELEPHONE ENCOUNTER
Referral done to Dr. Kennith Harada or his partners. If the pain is so bad then sometimes you do need to go to the emergency room and sometimes we will admit him for this type of injection as well.

## 2020-09-03 NOTE — TELEPHONE ENCOUNTER
[employer] for authorization of approval for MRI L-spine wo cpt code 38248 Approval was given with Authorization # [id number] effective 09/03/20 to 10/02/20. Will call Pt. To inform. L/m advising of approval. Can proceed with scheduling appt.

## 2020-09-03 NOTE — TELEPHONE ENCOUNTER
Patient is on day four of prednisone and states the pain continues to persist. He is tearful on the phone, he states he cannot even walk without holding on to something. He would like to have the injection that was discussed.

## 2020-09-03 NOTE — PATIENT INSTRUCTIONS
-MRI of the lumbar spine and follow up after  -Continue steroid and doxepin  -Start PT and home exercises

## 2020-09-03 NOTE — TELEPHONE ENCOUNTER
Patient informed of provider's response/recommendations below and voiced understating. Referral info provided.

## 2020-09-04 PROBLEM — M54.16 ACUTE RIGHT LUMBAR RADICULOPATHY: Status: ACTIVE | Noted: 2020-09-04

## 2020-09-04 PROBLEM — G47.9 SLEEP DISTURBANCE: Status: ACTIVE | Noted: 2020-09-04

## 2020-09-04 PROBLEM — R29.3 POOR POSTURE: Status: ACTIVE | Noted: 2020-09-04

## 2020-09-04 NOTE — PROGRESS NOTES
[address] [facility]  NEW PATIENT EVALUATION    Consultation as a request of Dr. Charmayne Maser    Chief Complaint: back pain.     HISTORY OF PRESENT ILLNESS:   Patient presents with:  Low Back Pain: Patient presents today c/o • High blood pressure    • Hyperlipidemia    • Osteoarthritis    • Sleep apnea    • Visual impairment     glasses         PAST SURGICAL HISTORY:     Past Surgical History:   Procedure Laterality Date   • FRACTURE SURGERY      right ankle surgery   • OCTAVIANO denies  Wheezing: denies   Gastrointestinal  Bowel Incontinence: denies  Heartburn: denies  Abdominal Pain: denies  Blood in Stool : denies  Rectal Pain: denies   Hematology  Easy Bruising: denies  Easy Bleeding: denies   Genitourinary  Difficulty Urinatin 1/4 at L4 and S1  Facet Loading: no specific facet pain  Straight leg raise: negative for radicular pain symptoms  Slump test: positive for pain symptoms for radicular pain symptoms      LABS:   No results found for: EAG, A1C  Lab Results   Component Value test as well. I recommend that he start a dedicated course of PT and home exercises although patient has been attending home exercises well for the last several weeks without any significant improvement.   Given the weakness as well, I recommended MRI imag

## 2020-09-05 ENCOUNTER — TELEPHONE (OUTPATIENT)
Dept: NEUROLOGY | Facility: CLINIC | Age: 65
End: 2020-09-05

## 2020-09-05 DIAGNOSIS — M54.16 ACUTE RIGHT LUMBAR RADICULOPATHY: Primary | ICD-10-CM

## 2020-09-05 PROCEDURE — 99441 PHONE E/M BY PHYS 5-10 MIN: CPT | Performed by: PHYSICAL MEDICINE & REHABILITATION

## 2020-09-05 RX ORDER — HYDROCODONE BITARTRATE AND ACETAMINOPHEN 5; 325 MG/1; MG/1
1 TABLET ORAL EVERY 6 HOURS PRN
Qty: 28 TABLET | Refills: 0 | Status: ON HOLD | OUTPATIENT
Start: 2020-09-05 | End: 2020-09-16

## 2020-09-05 NOTE — TELEPHONE ENCOUNTER
Spoke with patient over the phone. He is endorsing worsening symptoms of low back pain with radiation to the contralateral lower extremity as well. He has been taking steroid medication, doxepin and Tramadol with no improvement.  Patient is contemplating go

## 2020-09-08 ENCOUNTER — TELEPHONE (OUTPATIENT)
Dept: FAMILY MEDICINE CLINIC | Facility: CLINIC | Age: 65
End: 2020-09-08

## 2020-09-08 DIAGNOSIS — G89.29 CHRONIC RIGHT-SIDED LOW BACK PAIN WITH RIGHT-SIDED SCIATICA: Primary | ICD-10-CM

## 2020-09-08 DIAGNOSIS — M54.41 CHRONIC RIGHT-SIDED LOW BACK PAIN WITH RIGHT-SIDED SCIATICA: Primary | ICD-10-CM

## 2020-09-08 NOTE — TELEPHONE ENCOUNTER
LOV 8-    Patient asking for more prednisone for his chronic pain to right sided \"buttock area\". Prednisone was last prescribed 8/31/2020. Saw Reuben Palmer 9/5/2020  Patient states he is sick of Standard [place] and doesn't want to take it.    MRI was sche

## 2020-09-10 ENCOUNTER — TELEPHONE (OUTPATIENT)
Dept: NEUROLOGY | Facility: CLINIC | Age: 65
End: 2020-09-10

## 2020-09-10 NOTE — TELEPHONE ENCOUNTER
Spoke to patient he is still trying to figure out whos paying for the MRI W/C vs ins. Etc. Once all this is settled and he has his MRI he will call us back and schedule a f/u.

## 2020-09-11 RX ORDER — PREDNISONE 10 MG/1
30 TABLET ORAL DAILY
Qty: 15 TABLET | Refills: 0 | Status: ON HOLD | OUTPATIENT
Start: 2020-09-11 | End: 2020-09-16

## 2020-09-11 NOTE — TELEPHONE ENCOUNTER
Morning when to write for a short course of steroids as this is not safe to be on it for an extended period of time. He will be on 30 mg every day for 5 days only. If he continues to have pain he must follow-up with the pain specialist, Dr. Zafar Castaneda.

## 2020-09-14 ENCOUNTER — APPOINTMENT (OUTPATIENT)
Dept: LAB | Age: 65
End: 2020-09-14
Attending: NEUROLOGICAL SURGERY
Payer: COMMERCIAL

## 2020-09-14 ENCOUNTER — LAB ENCOUNTER (OUTPATIENT)
Dept: LAB | Age: 65
End: 2020-09-14
Attending: NEUROLOGICAL SURGERY
Payer: COMMERCIAL

## 2020-09-14 ENCOUNTER — NURSE ONLY (OUTPATIENT)
Dept: LAB | Age: 65
End: 2020-09-14
Attending: NEUROLOGICAL SURGERY
Payer: COMMERCIAL

## 2020-09-14 DIAGNOSIS — Z01.818 PREOP TESTING: ICD-10-CM

## 2020-09-14 LAB
ANION GAP SERPL CALC-SCNC: 4 MMOL/L (ref 0–18)
ANTIBODY SCREEN: NEGATIVE
BASOPHILS # BLD AUTO: 0.13 X10(3) UL (ref 0–0.2)
BASOPHILS NFR BLD AUTO: 0.6 %
BUN BLD-MCNC: 21 MG/DL (ref 7–18)
BUN/CREAT SERPL: 18.8 (ref 10–20)
CALCIUM BLD-MCNC: 9.5 MG/DL (ref 8.5–10.1)
CHLORIDE SERPL-SCNC: 105 MMOL/L (ref 98–112)
CO2 SERPL-SCNC: 31 MMOL/L (ref 21–32)
CREAT BLD-MCNC: 1.12 MG/DL (ref 0.7–1.3)
DEPRECATED RDW RBC AUTO: 47.7 FL (ref 35.1–46.3)
EOSINOPHIL # BLD AUTO: 0.36 X10(3) UL (ref 0–0.7)
EOSINOPHIL NFR BLD AUTO: 1.8 %
ERYTHROCYTE [DISTWIDTH] IN BLOOD BY AUTOMATED COUNT: 13.2 % (ref 11–15)
GLUCOSE BLD-MCNC: 87 MG/DL (ref 70–99)
HCT VFR BLD AUTO: 49.5 % (ref 39–53)
HGB BLD-MCNC: 16.7 G/DL (ref 13–17.5)
IMM GRANULOCYTES # BLD AUTO: 0.23 X10(3) UL (ref 0–1)
IMM GRANULOCYTES NFR BLD: 1.1 %
LYMPHOCYTES # BLD AUTO: 4.15 X10(3) UL (ref 1–4)
LYMPHOCYTES NFR BLD AUTO: 20.5 %
MCH RBC QN AUTO: 32.6 PG (ref 26–34)
MCHC RBC AUTO-ENTMCNC: 33.7 G/DL (ref 31–37)
MCV RBC AUTO: 96.7 FL (ref 80–100)
MONOCYTES # BLD AUTO: 1.38 X10(3) UL (ref 0.1–1)
MONOCYTES NFR BLD AUTO: 6.8 %
NEUTROPHILS # BLD AUTO: 13.96 X10 (3) UL (ref 1.5–7.7)
NEUTROPHILS # BLD AUTO: 13.96 X10(3) UL (ref 1.5–7.7)
NEUTROPHILS NFR BLD AUTO: 69.2 %
OSMOLALITY SERPL CALC.SUM OF ELEC: 292 MOSM/KG (ref 275–295)
PLATELET # BLD AUTO: 270 10(3)UL (ref 150–450)
POTASSIUM SERPL-SCNC: 4.1 MMOL/L (ref 3.5–5.1)
RBC # BLD AUTO: 5.12 X10(6)UL (ref 4.3–5.7)
RH BLOOD TYPE: POSITIVE
SODIUM SERPL-SCNC: 140 MMOL/L (ref 136–145)
WBC # BLD AUTO: 20.2 X10(3) UL (ref 4–11)

## 2020-09-14 PROCEDURE — 93005 ELECTROCARDIOGRAM TRACING: CPT

## 2020-09-14 PROCEDURE — 85025 COMPLETE CBC W/AUTO DIFF WBC: CPT

## 2020-09-14 PROCEDURE — 86850 RBC ANTIBODY SCREEN: CPT

## 2020-09-14 PROCEDURE — 86900 BLOOD TYPING SEROLOGIC ABO: CPT

## 2020-09-14 PROCEDURE — 86901 BLOOD TYPING SEROLOGIC RH(D): CPT

## 2020-09-14 PROCEDURE — 36415 COLL VENOUS BLD VENIPUNCTURE: CPT

## 2020-09-14 PROCEDURE — 93010 ELECTROCARDIOGRAM REPORT: CPT | Performed by: NEUROLOGICAL SURGERY

## 2020-09-14 PROCEDURE — 80048 BASIC METABOLIC PNL TOTAL CA: CPT

## 2020-09-14 RX ORDER — IBUPROFEN 200 MG
600 TABLET ORAL EVERY 6 HOURS PRN
COMMUNITY
End: 2021-05-10

## 2020-09-15 ENCOUNTER — HOSPITAL ENCOUNTER (EMERGENCY)
Facility: HOSPITAL | Age: 65
Discharge: HOME | End: 2020-09-15
Attending: EMERGENCY MEDICINE
Payer: OTHER MISCELLANEOUS

## 2020-09-15 ENCOUNTER — TELEPHONE (OUTPATIENT)
Dept: NEUROLOGY | Facility: CLINIC | Age: 65
End: 2020-09-15

## 2020-09-15 VITALS
RESPIRATION RATE: 17 BRPM | TEMPERATURE: 98 F | HEIGHT: 70 IN | OXYGEN SATURATION: 97 % | SYSTOLIC BLOOD PRESSURE: 159 MMHG | WEIGHT: 215 LBS | BODY MASS INDEX: 30.78 KG/M2 | DIASTOLIC BLOOD PRESSURE: 74 MMHG | HEART RATE: 86 BPM

## 2020-09-15 DIAGNOSIS — M54.50 CHRONIC LOW BACK PAIN, UNSPECIFIED BACK PAIN LATERALITY, UNSPECIFIED WHETHER SCIATICA PRESENT: Primary | ICD-10-CM

## 2020-09-15 DIAGNOSIS — G89.29 CHRONIC LOW BACK PAIN, UNSPECIFIED BACK PAIN LATERALITY, UNSPECIFIED WHETHER SCIATICA PRESENT: Primary | ICD-10-CM

## 2020-09-15 LAB
ANION GAP SERPL CALC-SCNC: 6 MMOL/L (ref 0–18)
BASOPHILS # BLD AUTO: 0.08 X10(3) UL (ref 0–0.2)
BASOPHILS NFR BLD AUTO: 0.5 %
BUN BLD-MCNC: 20 MG/DL (ref 7–18)
BUN/CREAT SERPL: 17.5 (ref 10–20)
CALCIUM BLD-MCNC: 9.7 MG/DL (ref 8.5–10.1)
CHLORIDE SERPL-SCNC: 104 MMOL/L (ref 98–112)
CO2 SERPL-SCNC: 32 MMOL/L (ref 21–32)
CREAT BLD-MCNC: 1.14 MG/DL (ref 0.7–1.3)
DEPRECATED RDW RBC AUTO: 47.3 FL (ref 35.1–46.3)
EOSINOPHIL # BLD AUTO: 0.38 X10(3) UL (ref 0–0.7)
EOSINOPHIL NFR BLD AUTO: 2.3 %
ERYTHROCYTE [DISTWIDTH] IN BLOOD BY AUTOMATED COUNT: 13.2 % (ref 11–15)
GLUCOSE BLD-MCNC: 161 MG/DL (ref 70–99)
HCT VFR BLD AUTO: 47.4 % (ref 39–53)
HGB BLD-MCNC: 16.1 G/DL (ref 13–17.5)
IMM GRANULOCYTES # BLD AUTO: 0.18 X10(3) UL (ref 0–1)
IMM GRANULOCYTES NFR BLD: 1.1 %
LYMPHOCYTES # BLD AUTO: 2.46 X10(3) UL (ref 1–4)
LYMPHOCYTES NFR BLD AUTO: 14.6 %
MCH RBC QN AUTO: 32.7 PG (ref 26–34)
MCHC RBC AUTO-ENTMCNC: 34 G/DL (ref 31–37)
MCV RBC AUTO: 96.3 FL (ref 80–100)
MONOCYTES # BLD AUTO: 1.03 X10(3) UL (ref 0.1–1)
MONOCYTES NFR BLD AUTO: 6.1 %
NEUTROPHILS # BLD AUTO: 12.72 X10 (3) UL (ref 1.5–7.7)
NEUTROPHILS # BLD AUTO: 12.72 X10(3) UL (ref 1.5–7.7)
NEUTROPHILS NFR BLD AUTO: 75.4 %
OSMOLALITY SERPL CALC.SUM OF ELEC: 300 MOSM/KG (ref 275–295)
PLATELET # BLD AUTO: 248 10(3)UL (ref 150–450)
POTASSIUM SERPL-SCNC: 4.2 MMOL/L (ref 3.5–5.1)
RBC # BLD AUTO: 4.92 X10(6)UL (ref 4.3–5.7)
SARS-COV-2 RNA RESP QL NAA+PROBE: NOT DETECTED
SODIUM SERPL-SCNC: 142 MMOL/L (ref 136–145)
WBC # BLD AUTO: 16.9 X10(3) UL (ref 4–11)

## 2020-09-15 PROCEDURE — 80048 BASIC METABOLIC PNL TOTAL CA: CPT | Performed by: EMERGENCY MEDICINE

## 2020-09-15 PROCEDURE — 36415 COLL VENOUS BLD VENIPUNCTURE: CPT

## 2020-09-15 PROCEDURE — 99283 EMERGENCY DEPT VISIT LOW MDM: CPT

## 2020-09-15 PROCEDURE — 85025 COMPLETE CBC W/AUTO DIFF WBC: CPT | Performed by: EMERGENCY MEDICINE

## 2020-09-15 PROCEDURE — 93005 ELECTROCARDIOGRAM TRACING: CPT

## 2020-09-15 PROCEDURE — 93010 ELECTROCARDIOGRAM REPORT: CPT | Performed by: EMERGENCY MEDICINE

## 2020-09-15 NOTE — ED INITIAL ASSESSMENT (HPI)
Pt to ED with wife for cardiac clearance for surgery tomorrow. Pt states he had an abnormal EKG yesterday. Surgery with Dr Manzano Plan tomorrow. Pt states he also had a elevated WBC of 20.2 yesterday. Pt denies cough or fever.  Pt currently denies any complaints

## 2020-09-15 NOTE — PAT NURSING NOTE
[facility] Jasbir Brannon notified of abnormal CBC result, specifically WBC and diff. States she will notify  Also notified MRSA not done and states do DOS.

## 2020-09-16 ENCOUNTER — APPOINTMENT (OUTPATIENT)
Dept: GENERAL RADIOLOGY | Facility: HOSPITAL | Age: 65
DRG: 519 | End: 2020-09-16
Attending: NEUROLOGICAL SURGERY
Payer: OTHER MISCELLANEOUS

## 2020-09-16 ENCOUNTER — ANESTHESIA (OUTPATIENT)
Dept: SURGERY | Facility: HOSPITAL | Age: 65
DRG: 519 | End: 2020-09-16
Payer: OTHER MISCELLANEOUS

## 2020-09-16 ENCOUNTER — ANESTHESIA EVENT (OUTPATIENT)
Dept: SURGERY | Facility: HOSPITAL | Age: 65
DRG: 519 | End: 2020-09-16
Payer: OTHER MISCELLANEOUS

## 2020-09-16 ENCOUNTER — HOSPITAL ENCOUNTER (INPATIENT)
Facility: HOSPITAL | Age: 65
LOS: 4 days | Discharge: HOME | DRG: 519 | End: 2020-09-20
Attending: NEUROLOGICAL SURGERY | Admitting: NEUROLOGICAL SURGERY
Payer: OTHER MISCELLANEOUS

## 2020-09-16 DIAGNOSIS — M48.061 LUMBAR STENOSIS: ICD-10-CM

## 2020-09-16 DIAGNOSIS — Z01.818 PREOP TESTING: Primary | ICD-10-CM

## 2020-09-16 PROBLEM — M48.02 CERVICAL SPINAL STENOSIS: Status: ACTIVE | Noted: 2020-09-16

## 2020-09-16 PROBLEM — M54.16 LUMBAR RADICULOPATHY: Status: ACTIVE | Noted: 2020-09-04

## 2020-09-16 PROBLEM — M48.02 CERVICAL SPINAL STENOSIS: Status: RESOLVED | Noted: 2020-09-16 | Resolved: 2020-09-16

## 2020-09-16 PROCEDURE — 99232 SBSQ HOSP IP/OBS MODERATE 35: CPT | Performed by: HOSPITALIST

## 2020-09-16 PROCEDURE — 01NR0ZZ RELEASE SACRAL NERVE, OPEN APPROACH: ICD-10-PCS | Performed by: NEUROLOGICAL SURGERY

## 2020-09-16 PROCEDURE — 00UT0KZ SUPPLEMENT SPINAL MENINGES WITH NONAUTOLOGOUS TISSUE SUBSTITUTE, OPEN APPROACH: ICD-10-PCS | Performed by: NEUROLOGICAL SURGERY

## 2020-09-16 PROCEDURE — 76000 FLUOROSCOPY <1 HR PHYS/QHP: CPT | Performed by: NEUROLOGICAL SURGERY

## 2020-09-16 PROCEDURE — 01NB0ZZ RELEASE LUMBAR NERVE, OPEN APPROACH: ICD-10-PCS | Performed by: NEUROLOGICAL SURGERY

## 2020-09-16 PROCEDURE — 0SB40ZZ EXCISION OF LUMBOSACRAL DISC, OPEN APPROACH: ICD-10-PCS | Performed by: NEUROLOGICAL SURGERY

## 2020-09-16 DEVICE — DURASEAL® DURAL SEALANT SYSTEM 5ML 5 PACK
Type: IMPLANTABLE DEVICE | Site: BACK | Status: FUNCTIONAL
Brand: DURASEAL®

## 2020-09-16 DEVICE — DURAGEN® PLUS DURAL REGENERATION MATRIX, 2 IN X 2 IN (5 CM X 5 CM)
Type: IMPLANTABLE DEVICE | Site: BACK | Status: FUNCTIONAL
Brand: DURAGEN® PLUS

## 2020-09-16 RX ORDER — LOSARTAN POTASSIUM 25 MG/1
25 TABLET ORAL
Status: DISCONTINUED | OUTPATIENT
Start: 2020-09-16 | End: 2020-09-20

## 2020-09-16 RX ORDER — HALOPERIDOL 5 MG/ML
0.25 INJECTION INTRAMUSCULAR ONCE AS NEEDED
Status: DISCONTINUED | OUTPATIENT
Start: 2020-09-16 | End: 2020-09-16 | Stop reason: HOSPADM

## 2020-09-16 RX ORDER — POLYETHYLENE GLYCOL 3350 17 G/17G
17 POWDER, FOR SOLUTION ORAL DAILY PRN
Status: DISCONTINUED | OUTPATIENT
Start: 2020-09-16 | End: 2020-09-20

## 2020-09-16 RX ORDER — PROCHLORPERAZINE EDISYLATE 5 MG/ML
10 INJECTION INTRAMUSCULAR; INTRAVENOUS EVERY 6 HOURS PRN
Status: ACTIVE | OUTPATIENT
Start: 2020-09-16 | End: 2020-09-18

## 2020-09-16 RX ORDER — HYDROMORPHONE HYDROCHLORIDE 1 MG/ML
0.8 INJECTION, SOLUTION INTRAMUSCULAR; INTRAVENOUS; SUBCUTANEOUS EVERY 2 HOUR PRN
Status: DISCONTINUED | OUTPATIENT
Start: 2020-09-16 | End: 2020-09-20

## 2020-09-16 RX ORDER — CEFAZOLIN SODIUM/WATER 2 G/20 ML
2 SYRINGE (ML) INTRAVENOUS ONCE
Status: COMPLETED | OUTPATIENT
Start: 2020-09-16 | End: 2020-09-16

## 2020-09-16 RX ORDER — SODIUM PHOSPHATE, DIBASIC AND SODIUM PHOSPHATE, MONOBASIC 7; 19 G/133ML; G/133ML
1 ENEMA RECTAL ONCE AS NEEDED
Status: DISCONTINUED | OUTPATIENT
Start: 2020-09-16 | End: 2020-09-20

## 2020-09-16 RX ORDER — HYDROCHLOROTHIAZIDE 25 MG/1
12.5 TABLET ORAL
Status: DISCONTINUED | OUTPATIENT
Start: 2020-09-16 | End: 2020-09-20

## 2020-09-16 RX ORDER — ACETAMINOPHEN 500 MG
1000 TABLET ORAL ONCE
Status: COMPLETED | OUTPATIENT
Start: 2020-09-16 | End: 2020-09-16

## 2020-09-16 RX ORDER — HYDROMORPHONE HYDROCHLORIDE 1 MG/ML
0.4 INJECTION, SOLUTION INTRAMUSCULAR; INTRAVENOUS; SUBCUTANEOUS EVERY 5 MIN PRN
Status: DISCONTINUED | OUTPATIENT
Start: 2020-09-16 | End: 2020-09-16 | Stop reason: HOSPADM

## 2020-09-16 RX ORDER — HYDROCODONE BITARTRATE AND ACETAMINOPHEN 5; 325 MG/1; MG/1
1 TABLET ORAL AS NEEDED
Status: DISCONTINUED | OUTPATIENT
Start: 2020-09-16 | End: 2020-09-16 | Stop reason: HOSPADM

## 2020-09-16 RX ORDER — SODIUM CHLORIDE 9 MG/ML
INJECTION, SOLUTION INTRAVENOUS CONTINUOUS
Status: DISCONTINUED | OUTPATIENT
Start: 2020-09-16 | End: 2020-09-20

## 2020-09-16 RX ORDER — FAMOTIDINE 20 MG/1
20 TABLET ORAL ONCE
Status: DISCONTINUED | OUTPATIENT
Start: 2020-09-16 | End: 2020-09-16 | Stop reason: HOSPADM

## 2020-09-16 RX ORDER — CEFAZOLIN SODIUM/WATER 2 G/20 ML
2 SYRINGE (ML) INTRAVENOUS EVERY 8 HOURS
Status: COMPLETED | OUTPATIENT
Start: 2020-09-16 | End: 2020-09-17

## 2020-09-16 RX ORDER — METHOCARBAMOL 750 MG/1
750 TABLET, FILM COATED ORAL 2 TIMES DAILY PRN
Status: DISCONTINUED | OUTPATIENT
Start: 2020-09-16 | End: 2020-09-20

## 2020-09-16 RX ORDER — MIDAZOLAM HYDROCHLORIDE 1 MG/ML
INJECTION INTRAMUSCULAR; INTRAVENOUS AS NEEDED
Status: DISCONTINUED | OUTPATIENT
Start: 2020-09-16 | End: 2020-09-16 | Stop reason: SURG

## 2020-09-16 RX ORDER — DOCUSATE SODIUM 100 MG/1
100 CAPSULE, LIQUID FILLED ORAL 2 TIMES DAILY
Status: DISCONTINUED | OUTPATIENT
Start: 2020-09-16 | End: 2020-09-20

## 2020-09-16 RX ORDER — ONDANSETRON 2 MG/ML
4 INJECTION INTRAMUSCULAR; INTRAVENOUS EVERY 4 HOURS PRN
Status: ACTIVE | OUTPATIENT
Start: 2020-09-16 | End: 2020-09-17

## 2020-09-16 RX ORDER — MORPHINE SULFATE 4 MG/ML
4 INJECTION, SOLUTION INTRAMUSCULAR; INTRAVENOUS EVERY 10 MIN PRN
Status: DISCONTINUED | OUTPATIENT
Start: 2020-09-16 | End: 2020-09-16 | Stop reason: HOSPADM

## 2020-09-16 RX ORDER — HYDROCODONE BITARTRATE AND ACETAMINOPHEN 10; 325 MG/1; MG/1
1 TABLET ORAL EVERY 4 HOURS PRN
Status: DISCONTINUED | OUTPATIENT
Start: 2020-09-16 | End: 2020-09-20

## 2020-09-16 RX ORDER — ROCURONIUM BROMIDE 10 MG/ML
INJECTION, SOLUTION INTRAVENOUS AS NEEDED
Status: DISCONTINUED | OUTPATIENT
Start: 2020-09-16 | End: 2020-09-16 | Stop reason: SURG

## 2020-09-16 RX ORDER — MORPHINE SULFATE 10 MG/ML
6 INJECTION, SOLUTION INTRAMUSCULAR; INTRAVENOUS EVERY 10 MIN PRN
Status: DISCONTINUED | OUTPATIENT
Start: 2020-09-16 | End: 2020-09-16 | Stop reason: HOSPADM

## 2020-09-16 RX ORDER — DEXAMETHASONE SODIUM PHOSPHATE 4 MG/ML
VIAL (ML) INJECTION AS NEEDED
Status: DISCONTINUED | OUTPATIENT
Start: 2020-09-16 | End: 2020-09-16 | Stop reason: SURG

## 2020-09-16 RX ORDER — SODIUM CHLORIDE, SODIUM LACTATE, POTASSIUM CHLORIDE, CALCIUM CHLORIDE 600; 310; 30; 20 MG/100ML; MG/100ML; MG/100ML; MG/100ML
INJECTION, SOLUTION INTRAVENOUS CONTINUOUS
Status: DISCONTINUED | OUTPATIENT
Start: 2020-09-16 | End: 2020-09-16 | Stop reason: HOSPADM

## 2020-09-16 RX ORDER — DIPHENHYDRAMINE HCL 25 MG
25 CAPSULE ORAL EVERY 4 HOURS PRN
Status: DISCONTINUED | OUTPATIENT
Start: 2020-09-16 | End: 2020-09-20

## 2020-09-16 RX ORDER — GLYCOPYRROLATE 0.2 MG/ML
INJECTION, SOLUTION INTRAMUSCULAR; INTRAVENOUS AS NEEDED
Status: DISCONTINUED | OUTPATIENT
Start: 2020-09-16 | End: 2020-09-16 | Stop reason: SURG

## 2020-09-16 RX ORDER — PROCHLORPERAZINE EDISYLATE 5 MG/ML
5 INJECTION INTRAMUSCULAR; INTRAVENOUS ONCE AS NEEDED
Status: DISCONTINUED | OUTPATIENT
Start: 2020-09-16 | End: 2020-09-16 | Stop reason: HOSPADM

## 2020-09-16 RX ORDER — HYDROMORPHONE HYDROCHLORIDE 1 MG/ML
0.4 INJECTION, SOLUTION INTRAMUSCULAR; INTRAVENOUS; SUBCUTANEOUS EVERY 2 HOUR PRN
Status: DISCONTINUED | OUTPATIENT
Start: 2020-09-16 | End: 2020-09-20

## 2020-09-16 RX ORDER — LIDOCAINE HYDROCHLORIDE 10 MG/ML
INJECTION, SOLUTION EPIDURAL; INFILTRATION; INTRACAUDAL; PERINEURAL AS NEEDED
Status: DISCONTINUED | OUTPATIENT
Start: 2020-09-16 | End: 2020-09-16 | Stop reason: SURG

## 2020-09-16 RX ORDER — ACETAMINOPHEN 325 MG/1
650 TABLET ORAL EVERY 4 HOURS PRN
Status: DISCONTINUED | OUTPATIENT
Start: 2020-09-16 | End: 2020-09-20

## 2020-09-16 RX ORDER — ONDANSETRON 2 MG/ML
INJECTION INTRAMUSCULAR; INTRAVENOUS AS NEEDED
Status: DISCONTINUED | OUTPATIENT
Start: 2020-09-16 | End: 2020-09-16 | Stop reason: SURG

## 2020-09-16 RX ORDER — LIDOCAINE HYDROCHLORIDE 40 MG/ML
SOLUTION TOPICAL AS NEEDED
Status: DISCONTINUED | OUTPATIENT
Start: 2020-09-16 | End: 2020-09-16 | Stop reason: SURG

## 2020-09-16 RX ORDER — ONDANSETRON 2 MG/ML
4 INJECTION INTRAMUSCULAR; INTRAVENOUS ONCE AS NEEDED
Status: DISCONTINUED | OUTPATIENT
Start: 2020-09-16 | End: 2020-09-16 | Stop reason: HOSPADM

## 2020-09-16 RX ORDER — DIPHENHYDRAMINE HYDROCHLORIDE 50 MG/ML
25 INJECTION INTRAMUSCULAR; INTRAVENOUS EVERY 4 HOURS PRN
Status: DISCONTINUED | OUTPATIENT
Start: 2020-09-16 | End: 2020-09-20

## 2020-09-16 RX ORDER — METOCLOPRAMIDE HYDROCHLORIDE 5 MG/ML
10 INJECTION INTRAMUSCULAR; INTRAVENOUS EVERY 6 HOURS PRN
Status: DISCONTINUED | OUTPATIENT
Start: 2020-09-16 | End: 2020-09-20

## 2020-09-16 RX ORDER — MORPHINE SULFATE 4 MG/ML
2 INJECTION, SOLUTION INTRAMUSCULAR; INTRAVENOUS EVERY 10 MIN PRN
Status: DISCONTINUED | OUTPATIENT
Start: 2020-09-16 | End: 2020-09-16 | Stop reason: HOSPADM

## 2020-09-16 RX ORDER — ROSUVASTATIN CALCIUM 10 MG/1
10 TABLET, COATED ORAL NIGHTLY
Status: DISCONTINUED | OUTPATIENT
Start: 2020-09-16 | End: 2020-09-20

## 2020-09-16 RX ORDER — HYDROMORPHONE HYDROCHLORIDE 1 MG/ML
0.6 INJECTION, SOLUTION INTRAMUSCULAR; INTRAVENOUS; SUBCUTANEOUS EVERY 5 MIN PRN
Status: DISCONTINUED | OUTPATIENT
Start: 2020-09-16 | End: 2020-09-16 | Stop reason: HOSPADM

## 2020-09-16 RX ORDER — HYDROMORPHONE HYDROCHLORIDE 1 MG/ML
0.2 INJECTION, SOLUTION INTRAMUSCULAR; INTRAVENOUS; SUBCUTANEOUS EVERY 5 MIN PRN
Status: DISCONTINUED | OUTPATIENT
Start: 2020-09-16 | End: 2020-09-16 | Stop reason: HOSPADM

## 2020-09-16 RX ORDER — HYDROCODONE BITARTRATE AND ACETAMINOPHEN 5; 325 MG/1; MG/1
2 TABLET ORAL AS NEEDED
Status: DISCONTINUED | OUTPATIENT
Start: 2020-09-16 | End: 2020-09-16 | Stop reason: HOSPADM

## 2020-09-16 RX ORDER — BISACODYL 10 MG
10 SUPPOSITORY, RECTAL RECTAL
Status: DISCONTINUED | OUTPATIENT
Start: 2020-09-16 | End: 2020-09-20

## 2020-09-16 RX ORDER — HYDROMORPHONE HYDROCHLORIDE 1 MG/ML
0.2 INJECTION, SOLUTION INTRAMUSCULAR; INTRAVENOUS; SUBCUTANEOUS EVERY 2 HOUR PRN
Status: DISCONTINUED | OUTPATIENT
Start: 2020-09-16 | End: 2020-09-20

## 2020-09-16 RX ORDER — BUPIVACAINE HYDROCHLORIDE 2.5 MG/ML
INJECTION, SOLUTION EPIDURAL; INFILTRATION; INTRACAUDAL AS NEEDED
Status: DISCONTINUED | OUTPATIENT
Start: 2020-09-16 | End: 2020-09-16 | Stop reason: HOSPADM

## 2020-09-16 RX ORDER — METOCLOPRAMIDE 10 MG/1
10 TABLET ORAL ONCE
Status: DISCONTINUED | OUTPATIENT
Start: 2020-09-16 | End: 2020-09-16 | Stop reason: HOSPADM

## 2020-09-16 RX ORDER — NALOXONE HYDROCHLORIDE 0.4 MG/ML
80 INJECTION, SOLUTION INTRAMUSCULAR; INTRAVENOUS; SUBCUTANEOUS AS NEEDED
Status: DISCONTINUED | OUTPATIENT
Start: 2020-09-16 | End: 2020-09-16 | Stop reason: HOSPADM

## 2020-09-16 RX ORDER — SODIUM CHLORIDE, SODIUM LACTATE, POTASSIUM CHLORIDE, CALCIUM CHLORIDE 600; 310; 30; 20 MG/100ML; MG/100ML; MG/100ML; MG/100ML
INJECTION, SOLUTION INTRAVENOUS CONTINUOUS
Status: DISCONTINUED | OUTPATIENT
Start: 2020-09-16 | End: 2020-09-20

## 2020-09-16 RX ORDER — SENNOSIDES 8.6 MG
17.2 TABLET ORAL NIGHTLY
Status: DISCONTINUED | OUTPATIENT
Start: 2020-09-16 | End: 2020-09-20

## 2020-09-16 RX ORDER — HYDROCODONE BITARTRATE AND ACETAMINOPHEN 10; 325 MG/1; MG/1
2 TABLET ORAL EVERY 4 HOURS PRN
Status: DISCONTINUED | OUTPATIENT
Start: 2020-09-16 | End: 2020-09-20

## 2020-09-16 RX ADMIN — SODIUM CHLORIDE, SODIUM LACTATE, POTASSIUM CHLORIDE, CALCIUM CHLORIDE: 600; 310; 30; 20 INJECTION, SOLUTION INTRAVENOUS at 14:22:00

## 2020-09-16 RX ADMIN — MIDAZOLAM HYDROCHLORIDE 2 MG: 1 INJECTION INTRAMUSCULAR; INTRAVENOUS at 11:48:00

## 2020-09-16 RX ADMIN — DEXAMETHASONE SODIUM PHOSPHATE 4 MG: 4 MG/ML VIAL (ML) INJECTION at 11:59:00

## 2020-09-16 RX ADMIN — SODIUM CHLORIDE, SODIUM LACTATE, POTASSIUM CHLORIDE, CALCIUM CHLORIDE: 600; 310; 30; 20 INJECTION, SOLUTION INTRAVENOUS at 15:26:00

## 2020-09-16 RX ADMIN — ROCURONIUM BROMIDE 10 MG: 10 INJECTION, SOLUTION INTRAVENOUS at 11:53:00

## 2020-09-16 RX ADMIN — GLYCOPYRROLATE 0.2 MG: 0.2 INJECTION, SOLUTION INTRAMUSCULAR; INTRAVENOUS at 11:52:00

## 2020-09-16 RX ADMIN — SODIUM CHLORIDE, SODIUM LACTATE, POTASSIUM CHLORIDE, CALCIUM CHLORIDE: 600; 310; 30; 20 INJECTION, SOLUTION INTRAVENOUS at 14:21:00

## 2020-09-16 RX ADMIN — LIDOCAINE HYDROCHLORIDE 4 ML: 40 SOLUTION TOPICAL at 11:53:00

## 2020-09-16 RX ADMIN — LIDOCAINE HYDROCHLORIDE 50 MG: 10 INJECTION, SOLUTION EPIDURAL; INFILTRATION; INTRACAUDAL; PERINEURAL at 11:53:00

## 2020-09-16 RX ADMIN — ONDANSETRON 4 MG: 2 INJECTION INTRAMUSCULAR; INTRAVENOUS at 11:59:00

## 2020-09-16 RX ADMIN — CEFAZOLIN SODIUM/WATER 2 G: 2 G/20 ML SYRINGE (ML) INTRAVENOUS at 12:08:00

## 2020-09-16 NOTE — BRIEF OP NOTE
Pre-Operative Diagnosis: lumbar stenosis     Post-Operative Diagnosis: lumbar stenosis      Procedure Performed:   Procedure(s):  L3-S1 lumbar laminectomy, resection of right L3-4 synovial cyst and intraoperative inspection of right L5-S1 disc    Surgeon(s

## 2020-09-16 NOTE — PLAN OF CARE
Problem: Patient Centered Care  Goal: Patient preferences are identified and integrated in the patient's plan of care  Description  Interventions:  - What would you like us to know as we care for you?  I live with my wife and she's my primary care partner on type and severity of pain and evaluate response  - Implement non-pharmacological measures as appropriate and evaluate response  - Consider cultural and social influences on pain and pain management  - Manage/alleviate anxiety  - Utilize distraction and/ ability to be responsible for managing their own health  - Refer to Case Management Department for coordinating discharge planning if the patient needs post-hospital services based on physician/LIP order or complex needs related to functional status, cogni SKIN/TISSUE INTEGRITY - ADULT  Goal: Incision(s), wounds(s) or drain site(s) healing without S/S of infection  Description  INTERVENTIONS:  - Assess and document risk factors for pressure ulcer development  - Assess and document skin integrity  - Assess an

## 2020-09-16 NOTE — ANESTHESIA POSTPROCEDURE EVALUATION
Patient: Magno Rizo    Procedure Summary     Date:  09/16/20 Room / Location:  [facility] OR 01 / [facility] OR    Anesthesia Start:  2421 Anesthesia Stop:  9683    Procedure:  LUMBAR LAMINECTOMY 3 LEVEL (N/A Back) Diagnosis:  (lumbar stenosis)    Surge

## 2020-09-16 NOTE — ED PROVIDER NOTES
Patient Seen in: [facility] Emergency Department    History   Patient presents with:  Medical Clearance      HPI    Patient presents to the ED requesting clearance for back surgery tomorrow.   He states that he was told his EKG was abnormal today a Substance and Sexual Activity      Alcohol use: No      Drug use: No    Other Topics      Concerns:        Caffeine Concern: Yes          coffee/ soda-1 cup/day      ROS  Pertinent Positives:  low back pain  All other organ systems are reviewed and are neg Result Value    Glucose 161 (*)     BUN 20 (*)     Calculated Osmolality 300 (*)     All other components within normal limits   CBC W/ DIFFERENTIAL - Abnormal; Notable for the following components:    WBC 16.9 (*)     RDW-SD 47.3 (*)     Neutrophil Absolu reviewed those reports. Complicating Factors: The patient already has does not have any pertinent problems on file. to contribute to the complexity of this ED evaluation.     ED Course: Patient presents to the ED for an abnormal EKG that is been unchange

## 2020-09-16 NOTE — ANESTHESIA PROCEDURE NOTES
Airway  Urgency: Elective      General Information and Staff    Patient location during procedure: OR  Anesthesiologist: Maria Fernanda Glover MD  Resident/CRNA: Ann Eckert CRNA  Performed: CRNA     Indications and Patient Condition  Indications for ai

## 2020-09-16 NOTE — ANESTHESIA PREPROCEDURE EVALUATION
Anesthesia PreOp Note    HPI:     Moises Ortzi is a 60year old male who presents for preoperative consultation requested by: Bee Narvaez MD    Date of Surgery: 9/16/2020    Procedure(s):  LUMBAR LAMINECTOMY 3 LEVEL  Indication: lumbar stenosis    R pressure    • High cholesterol    • Hyperlipidemia    • Osteoarthritis     eyeglasses   • Pneumonia due to organism    • Sleep apnea     CPAP machine   • Visual impairment     glasses       Past Surgical History:   Procedure Laterality Date   • COLONOSCOPY Moises Harrington MD    No current [facility]-ordered outpatient medications on file.         Lisinopril              Coughing    Family History   Problem Relation Age of Onset   • Diabetes Father    • Stroke Father 68     Social History    Socioeconomic History Weight Concern: Not Asked        Special Diet: Not Asked        Back Care: Not Asked        Exercise: Not Asked        Bike Helmet: Not Asked        Seat Belt: Not Asked        Self-Exams: Not Asked        Left Handed: Not Asked        Right Handed: Not As Neuro/Psych    (+)  neuromuscular disease,       GI/Hepatic/Renal - negative ROS     Endo/Other - negative ROS   Abdominal  - normal exam               Anesthesia Plan:   ASA:  3  Plan:   General  Airway:  ETT  Post-op Pain Management: IV analgesics and

## 2020-09-16 NOTE — INTERVAL H&P NOTE
Pre-op Diagnosis: lumbar stenosis    The above referenced H&P was reviewed by Elena Downs MD on 9/16/2020, the patient was examined and no significant changes have occurred in the patient's condition since the H&P was performed.   I discussed with the mike

## 2020-09-16 NOTE — PROGRESS NOTES
[facility] HOSP - [facility]    Progress Note    Tracy Fernandez Patient Status:  Inpatient    1955 MRN [id number]   Location [address] [facility] Attending Sheela Mejias MD   Hosp Day # 0 PCP DO RIOS Estes PROPHYLAXIS, PT/OT. Essential hypertension, benign  CONT HOME MEDS, MONITOR. Mixed hyperlipidemia  CONT HOME MEDS.              Results:     Lab Results   Component Value Date    WBC 16.9 (H) 09/15/2020    HGB 16.1 09/15/2020    HCT 47.4 09/15/2

## 2020-09-17 PROCEDURE — 99232 SBSQ HOSP IP/OBS MODERATE 35: CPT | Performed by: HOSPITALIST

## 2020-09-17 NOTE — OCCUPATIONAL THERAPY NOTE
OCCUPATIONAL THERAPY EVALUATION - INPATIENT      Room Number: 415/415-A  Evaluation Date: 9/17/2020  Type of Evaluation: Initial  Presenting Problem: (lumbar radiculopathy)    Physician Order: IP Consult to Occupational Therapy  Reason for Therapy: ADL/IAD simplification techniques;ADL training;Functional transfer training; Endurance training;Patient/Family education;Patient/Family training;Equipment eval/education; Compensatory technique education       OCCUPATIONAL THERAPY MEDICAL/SOCIAL HISTORY     Problem Location: Right arm  BP Method: Automatic  Patient Position: Sitting    O2 SATURATIONS    COGNITION  Overall Cognitive Status:  WFL - within functional limits    RANGE OF MOTION   Upper extremity ROM is within functional limits    ADDITIONAL TESTS    ACTIV questions and concerns addressed    OT Goals  Patient self-stated goal is: does not state      Patient will complete LE dressing at MOD I   Comment:     Patient will complete toilet transfer with MOD I   Comment:     Patient will complete self care task at

## 2020-09-17 NOTE — PHYSICAL THERAPY NOTE
PHYSICAL THERAPY EVALUATION - INPATIENT     Room Number: 415/415-A  Evaluation Date: 9/17/2020  Type of Evaluation: Initial   Physician Order: See Comment for Specific Order(postsurgical)    Presenting Problem: L3-S1 laminectomies, resection R L3-4 synovi understanding. Pt left seated EOB, all needs in place, RN aware. PM SESSION at 1:55 PM:  Chart reviewed, pt cleared for therapy tx by RN. Pt received seated in bedside chair, agreeable to therapy.  Pt prompted on spine precautions, recalls 3/3 when education;Stair training  Rehab Potential : Good  Frequency (Obs): Daily    PHYSICAL THERAPY MEDICAL/SOCIAL HISTORY     Problem List  Principal Problem:    Lumbar radiculopathy  Active Problems:    Essential hypertension, benign    Mixed hyperlipidemia STRENGTH ASSESSMENT  Upper extremity ROM and strength are within functional limits     Lower extremity ROM is within functional limits     Lower extremity strength is within functional limits except for the following:    Right Dorsiflexion  3/5  Left Dorsi rolling walker at MOD IND  PM SESSION- sit to stand tx with rolling walker at MOD IND    Exercise/Education Provided:  Bed mobility  Body mechanics  Energy conservation  Functional activity tolerated  Gait training  ROM  Transfer training  spine precaution

## 2020-09-17 NOTE — OPERATIVE REPORT
[facility]    PATIENT'S NAME: Zahida Germain   ATTENDING PHYSICIAN: Minda Harada, MD   OPERATING PHYSICIAN: Bette Multani MD   PATIENT ACCOUNT#:   [id number]    LOCATION:  [address] [place] [address] #:   [id number]       DATE OF BIRTH:  1 over the spinous process and lamina from L3 to S1 was performed. Subsequent fluoroscopy confirmed the levels. The spinous processes were removed using rongeur and midline laminectomy and partial medial facetectomy was performed using a high-speed drill.

## 2020-09-17 NOTE — PLAN OF CARE
Pt is aox4, ambulating wit walker and one assist, was able to ambulate over 500ft with RN overnight around the unit, tolerated well and denies nausea or dizziness. Voiding freely to urinal. Teds and scds bilaterally for DVT prophylaxis.  Dressing to lumbar with oral medications.   - Monitor incision for any signs of infection.  - Ice as needed to incision.  - Up as tolerated using a walker.  - See additional Care Plan goals for specific interventions  Outcome: Progressing  Goal: Patient/Family Short Term Goal precautions as indicated by assessment.  - Educate pt/family on patient safety including physical limitations  - Instruct pt to call for assistance with activity based on assessment  - Modify environment to reduce risk of injury  - Provide assistive device Position to facilitate oxygenation and minimize respiratory effort  - Oxygen supplementation based on oxygen saturation or ABGs  - Provide Smoking Cessation handout, if applicable  - Encourage broncho-pulmonary hygiene including cough, deep breathe, Incent ordered activity level and limitations with patient/family  Outcome: Progressing  Goal: Maintain proper alignment of affected body part  Description  INTERVENTIONS:  - Support and protect limb and body alignment per provider's orders  - Instruct and reinfo

## 2020-09-17 NOTE — PROGRESS NOTES
NEUROSURGERY - POD #1 S/P L3-S1 LAMINECTOMY      S:  Patient complaining of incision pain. States preoperative leg pain has resolved. Still having numbness in the legs.   Has been up ambulating and states that he feels that he has noticed some improvement

## 2020-09-17 NOTE — PROGRESS NOTES
[facility]    Progress Note    Wanda Mathew Patient Status:  Inpatient    1955 MRN [id number]   Location [facility] 4W/SW/SE Attending Justice Rubinstein, MD   Hosp Day # 1 PCP Ke Estrada DO       Subjective:     V 03/03/2018    PSA 0.6 01/30/2017       Xr Fluoroscopy C-arm Time <1 Hour  (cpt=76000)    Result Date: 9/16/2020  CONCLUSION:  1. Intraoperative fluoroscopy was utilized.     Dictated by (CST): Vidhya Stallworth MD on 9/16/2020 at 1:53 PM     Finalized

## 2020-09-18 PROCEDURE — 99232 SBSQ HOSP IP/OBS MODERATE 35: CPT | Performed by: HOSPITALIST

## 2020-09-18 RX ORDER — CEFAZOLIN SODIUM/WATER 2 G/20 ML
2 SYRINGE (ML) INTRAVENOUS EVERY 8 HOURS
Status: COMPLETED | OUTPATIENT
Start: 2020-09-18 | End: 2020-09-19

## 2020-09-18 NOTE — PLAN OF CARE
Alert and oriented x 4. Numbness to BLE. Right dorsi flexion weak. SCD/TEDs. Room air. Encourage incentive 10x/hr while awake. Bowel sounds present. Denies nausea/vomiting. Tolerated diet. LBM 9/16/20. Voiding freely.; Output WNL.   Lower back dressing CDI above waist area. - Pain managed with oral medications.   - Monitor incision for any signs of infection.  - Ice as needed to incision.  - Up as tolerated using a walker.  - See additional Care Plan goals for specific interventions  Outcome: Progressing other facility with appropriate resources  Description  INTERVENTIONS:  - Identify barriers to discharge w/pt and caregiver  - Include patient/family/discharge partner in discharge planning  - Arrange for needed discharge resources and transportation as ap Manage/alleviate anxiety  - Monitor for signs/symptoms of CO2 retention  Outcome: Progressing     Problem: GENITOURINARY - ADULT  Goal: Absence of urinary retention  Description  INTERVENTIONS:  - Assess patient’s ability to void and empty bladder  - Monit

## 2020-09-18 NOTE — PLAN OF CARE
Pt aox4, ambulating in the eldirdge, pt dressing is dry and intact, hemovac still draining over 130 ml of fluid. PT pain controlled with 2 10 mg norco every 6 hours. Pt passing gas and BM. Voiding up to the bathroom. Will continue to monitor.    Problem: [place] ADULT  Goal: Verbalizes/displays adequate comfort level or patient's stated pain goal  Description  INTERVENTIONS:  - Encourage pt to monitor pain and request assistance  - Assess pain using appropriate pain scale  - Administer analgesics based on type and Identify discharge learning needs (meds, wound care, etc)  - Arrange for interpreters to assist at discharge as needed  - Consider post-discharge preferences of patient/family/discharge partner  - Complete POLST form as appropriate  - Assess patient's abil perform bladder scan as needed  - Follow urinary retention protocol/standard of care  - Consider collaborating with pharmacy to review patient's medication profile  - Implement strategies to promote bladder emptying  Outcome: Progressing     Problem: SKIN/

## 2020-09-18 NOTE — PROGRESS NOTES
[facility]    Progress Note    Jenniferandrea Vega Patient Status:  Inpatient    1955 MRN [id number]   Location [facility] 4W/SW/SE Attending Jaye William MD   Hosp Day # 2 PCP Violet Gallegos DO       Subjective:     F 161 (H) 09/15/2020    CA 9.7 09/15/2020    ALB 4.1 02/08/2020    ALKPHO 73 02/08/2020    BILT 0.4 02/08/2020    TP 6.3 02/08/2020    AST 29 02/08/2020    ALT 32 02/08/2020    TSH 0.90 03/03/2018    PSA 0.6 01/30/2017       No results found.           Tere Calabrese

## 2020-09-18 NOTE — PHYSICAL THERAPY NOTE
PHYSICAL THERAPY TREATMENT NOTE - INPATIENT     Room Number: 116/092-N       Presenting Problem: L3-S1 laminectomies, resection R L3-4 synovial cyst, inspection R L5-S1 disc 9/16    Problem List  Principal Problem:    Lumbar radiculopathy  Active Problems: None   How much help from another person does the patient currently need. ..   -   Moving to and from a bed to a chair (including a wheelchair)?: None   -   Need to walk in hospital room?: None   -   Climbing 3-5 steps with a railing?: A Little     AM-PAC S progress- recalls 3/3 spine precautions when prompted, minimal verbal cues to maintain during mobility   Goal #6    Goal #6  Current Status

## 2020-09-18 NOTE — OCCUPATIONAL THERAPY NOTE
OCCUPATIONAL THERAPY TREATMENT NOTE - INPATIENT    Room Number: 771/973-L         Presenting Problem: (lumbar radiculopathy)     Problem List  Principal Problem:    Lumbar radiculopathy  Active Problems:    Essential hypertension, benign    Mixed hyperlipi training;Equipment eval/education; Compensatory technique education    SUBJECTIVE  Pt seen up in chair and agreeable for OT session     OBJECTIVE  Precautions: Spine    WEIGHT BEARING RESTRICTION  Weight Bearing Restriction: None                PAIN ASSESSM complete LE dressing at MOD I   Comment: supervision     Patient will complete toilet transfer with MOD I   Comment: superviision with grab bar for support    Patient will complete self care task at sink level with INDEP   Comment:supervision     Patient w

## 2020-09-18 NOTE — PROGRESS NOTES
POD #2        Ms. Jourdan De Anda is sitting in the chair. He complains of low back soreness and numbness in the legs. The radiating leg pain he had prior to surgery has resolved. He ambulated in the hallway several times yesterday.   No headaches, nausea, vomi

## 2020-09-18 NOTE — CM/SW NOTE
JIMMY received MDO for DC planning. JIMMY performed chart review and discussed with RN Nagi Trujillo. Per PT/OT notes pt lives in a house with several levels. There are 6 steps into the home. Pt lives with spouse.  Therapy recommends Home/ Outpatient PT and pt is wal

## 2020-09-19 PROCEDURE — 99232 SBSQ HOSP IP/OBS MODERATE 35: CPT | Performed by: HOSPITALIST

## 2020-09-19 RX ORDER — CEFAZOLIN SODIUM/WATER 2 G/20 ML
2 SYRINGE (ML) INTRAVENOUS EVERY 8 HOURS
Status: DISCONTINUED | OUTPATIENT
Start: 2020-09-20 | End: 2020-09-20

## 2020-09-19 RX ORDER — POTASSIUM CHLORIDE 20 MEQ/1
40 TABLET, EXTENDED RELEASE ORAL ONCE
Status: COMPLETED | OUTPATIENT
Start: 2020-09-19 | End: 2020-09-19

## 2020-09-19 NOTE — PLAN OF CARE
Problem: Patient Centered Care  Goal: Patient preferences are identified and integrated in the patient's plan of care  Description: Interventions:  - What would you like us to know as we care for you?  I live with my wife and she's my primary care partner or patient's stated pain goal  Description: INTERVENTIONS:  - Encourage pt to monitor pain and request assistance  - Assess pain using appropriate pain scale  - Administer analgesics based on type and severity of pain and evaluate response  - Implement non patient/family/discharge partner in discharge planning  - Arrange for needed discharge resources and transportation as appropriate  - Identify discharge learning needs (meds, wound care, etc)  - Arrange for interpreters to assist at discharge as needed  - Manage/alleviate anxiety  - Monitor for signs/symptoms of CO2 retention  Outcome: Progressing     Problem: GENITOURINARY - ADULT  Goal: Absence of urinary retention  Description: INTERVENTIONS:  - Assess patient’s ability to void and empty bladder  - Monit and family use of appropriate assistive device and precautions (e.g. spinal or hip dislocation precautions)  Outcome: Progressing  Note: Conscious of standing tall and looking forward when walking with the walker as educated by PT     Patient A&OX4, has pa

## 2020-09-19 NOTE — PLAN OF CARE
POD 2-3, A&Ox4, [place] at times. Indep w/ RW in room. SBA w/ RW in hallway. LLE stronger than RLE. Teds/SCDs. CPAP. History of smoking, audible wheezing at times. Nicotine patch declined. Tele box #75. Miralax given last night at 2030, LBM 9/16.   Nor Up as tolerated using a walker.  - See additional Care Plan goals for specific interventions  Outcome: Progressing     Problem: PAIN - ADULT  Goal: Verbalizes/displays adequate comfort level or patient's stated pain goal  Description  INTERVENTIONS:  - Enc patient/family/discharge partner in discharge planning  - Arrange for needed discharge resources and transportation as appropriate  - Identify discharge learning needs (meds, wound care, etc)  - Arrange for interpreters to assist at discharge as needed  - urinary retention  Description  INTERVENTIONS:  - Assess patient’s ability to void and empty bladder  - Monitor intake/output and perform bladder scan as needed  - Follow urinary retention protocol/standard of care  - Consider collaborating with pharmacy t

## 2020-09-19 NOTE — PROGRESS NOTES
[facility] - [facility]    Progress Note    Sana Champagne Patient Status:  Inpatient    1955 MRN [id number]   Location [facility] 4W/SW/SE Attending Meryle Guarneri, MD   Hosp Day # 3 PCP Zahira Sainz DO       Subjective:     F 09/19/2020    CO2 35.0 (H) 09/19/2020    GLU 82 09/19/2020    CA 8.9 09/19/2020    ALB 2.8 (L) 09/19/2020    ALKPHO 73 02/08/2020    BILT 0.4 02/08/2020    TP 6.3 02/08/2020    AST 29 02/08/2020    ALT 32 02/08/2020    TSH 0.90 03/03/2018    PSA 0.6 01/30/

## 2020-09-19 NOTE — PROGRESS NOTES
S: Mr. Shakir Prince resting comfortably in the chair. He complains of some low back pain, controlled with medication. He has been ambulatory with PT/OT and he feels that he continues to improve compared to prior to surgery.       O: Temp:  [97.7 °F (36.5 °C)-9

## 2020-09-20 VITALS
DIASTOLIC BLOOD PRESSURE: 60 MMHG | HEIGHT: 70 IN | RESPIRATION RATE: 20 BRPM | OXYGEN SATURATION: 99 % | WEIGHT: 208.31 LBS | HEART RATE: 70 BPM | SYSTOLIC BLOOD PRESSURE: 130 MMHG | TEMPERATURE: 98 F | BODY MASS INDEX: 29.82 KG/M2

## 2020-09-20 PROCEDURE — 99238 HOSP IP/OBS DSCHRG MGMT 30/<: CPT | Performed by: HOSPITALIST

## 2020-09-20 RX ORDER — POLYETHYLENE GLYCOL 3350 17 G/17G
17 POWDER, FOR SOLUTION ORAL DAILY PRN
Refills: 0 | Status: SHIPPED | COMMUNITY
Start: 2020-09-20 | End: 2021-05-10 | Stop reason: ALTCHOICE

## 2020-09-20 RX ORDER — METHOCARBAMOL 750 MG/1
750 TABLET, FILM COATED ORAL 2 TIMES DAILY PRN
Qty: 30 TABLET | Refills: 0 | Status: SHIPPED | OUTPATIENT
Start: 2020-09-20 | End: 2021-05-10

## 2020-09-20 NOTE — DISCHARGE SUMMARY
[facility] HOSPITALIST  DISCHARGE SUMMARY     Svetlana Edwards Patient Status:  Inpatient    1955 MRN [id number]   Location [facility] 4W/SW/SE Attending No att. providers found   Hosp Day # 4 PCP Rosalina Padilla DO     Date of Admission:  Quantity: 90 tablet  Refills: 1        CONTINUE taking these medications      Instructions Prescription details   Cholecalciferol 125 MCG (5000 UT) Tabs  Commonly known as: VITAMIN D-3      Take 1 tablet by mouth daily.    Refills: 0     hydrochlorothiazide Risk  0-28   Low Risk. TCM Follow-Up Recommendation:  LACE < 29: Low Risk of readmission after discharge from the hospital. No TCM follow-up needed.         Sarina Boston MD 9/20/2020    Time spent:  < 30 minutes

## 2020-09-20 NOTE — PLAN OF CARE
Pt is POD 4. aox4, ambulating with walker SBA, independent in room but still calls appropriately. Voiding freely to bathroom. Teds and scds bilaterally for DVT prophylaxis. Dressing to lumbar back with old shading, clean and dry telfa with tegaderm.  HVAC d medications.   - Monitor incision for any signs of infection.  - Ice as needed to incision.  - Up as tolerated using a walker.  - See additional Care Plan goals for specific interventions  Outcome: Progressing  Goal: Patient/Family Short Term Goal  Descript indicated by assessment.  - Educate pt/family on patient safety including physical limitations  - Instruct pt to call for assistance with activity based on assessment  - Modify environment to reduce risk of injury  - Provide assistive devices as appropriat facilitate oxygenation and minimize respiratory effort  - Oxygen supplementation based on oxygen saturation or ABGs  - Provide Smoking Cessation handout, if applicable  - Encourage broncho-pulmonary hygiene including cough, deep breathe, Incentive Spiromet activity level and limitations with patient/family  Outcome: Progressing  Goal: Maintain proper alignment of affected body part  Description: INTERVENTIONS:  - Support and protect limb and body alignment per provider's orders  - Instruct and reinforce with

## 2020-09-20 NOTE — PROGRESS NOTES
S: Mr. Jani Schilder sitting comfortably in the chair. He complains of minimal low back discomfort, controlled with medication. He feels that his leg pain has improved. He has has been ambulatory with PT/OT and he denies bowel/bladder dysfunction.   Overall,

## 2020-09-20 NOTE — PROGRESS NOTES
PHYSICAL THERAPY TREATMENT NOTE - INPATIENT     Room Number: 415/415-A       Presenting Problem: s/p L3-S1 disectomy 9. 16(work related injury per Pt. )    Problem List  Principal Problem:    Lumbar radiculopathy  Active Problems:    Essential hypertension, mechanics; Relaxation    BALANCE                                                                                                                     Static Sitting: Good  Dynamic Sitting: Good           Static Standing: Good  Dynamic Standing: Fair    ACTIV Goal #2  Current Status Pt performs tx with rolling walker at MOD IND; achieved   Goal #3 Patient is able to ambulate 300 feet with assist device: walker - rolling at assistance level: modified independent   Goal #3   Current Status 250 ft w/ cga-SBA w/

## 2020-11-26 DIAGNOSIS — I10 ESSENTIAL HYPERTENSION, BENIGN: ICD-10-CM

## 2020-11-27 RX ORDER — HYDROCHLOROTHIAZIDE 12.5 MG/1
TABLET ORAL
Qty: 90 TABLET | Refills: 1 | Status: SHIPPED | OUTPATIENT
Start: 2020-11-27 | End: 2021-05-10

## 2020-12-07 DIAGNOSIS — I10 ESSENTIAL HYPERTENSION, BENIGN: ICD-10-CM

## 2020-12-07 NOTE — TELEPHONE ENCOUNTER
Current Outpatient Medications:   •  Losartan Potassium 25 MG Oral Tab, Take 1 tablet (25 mg total) by mouth once daily. , Disp: 90 tablet, Rfl: 1

## 2020-12-08 RX ORDER — LOSARTAN POTASSIUM 25 MG/1
25 TABLET ORAL
Qty: 90 TABLET | Refills: 1 | Status: SHIPPED | OUTPATIENT
Start: 2020-12-08 | End: 2021-03-06

## 2020-12-08 NOTE — TELEPHONE ENCOUNTER
Pt is calling for status of his medication refill request. Pt is out of medication and can be reached at [phone].

## 2021-03-02 DIAGNOSIS — E78.2 MIXED HYPERLIPIDEMIA: ICD-10-CM

## 2021-03-02 RX ORDER — ROSUVASTATIN CALCIUM 10 MG/1
10 TABLET, COATED ORAL DAILY
Qty: 90 TABLET | Refills: 1 | Status: SHIPPED | OUTPATIENT
Start: 2021-03-02 | End: 2021-08-19

## 2021-03-06 DIAGNOSIS — I10 ESSENTIAL HYPERTENSION, BENIGN: ICD-10-CM

## 2021-03-06 RX ORDER — LOSARTAN POTASSIUM 25 MG/1
TABLET ORAL
Qty: 90 TABLET | Refills: 1 | Status: SHIPPED | OUTPATIENT
Start: 2021-03-06 | End: 2021-06-03

## 2021-05-10 ENCOUNTER — OFFICE VISIT (OUTPATIENT)
Dept: FAMILY MEDICINE CLINIC | Facility: CLINIC | Age: 66
End: 2021-05-10
Payer: COMMERCIAL

## 2021-05-10 VITALS
HEART RATE: 70 BPM | SYSTOLIC BLOOD PRESSURE: 124 MMHG | DIASTOLIC BLOOD PRESSURE: 67 MMHG | WEIGHT: 228.63 LBS | TEMPERATURE: 98 F | HEIGHT: 70 IN | BODY MASS INDEX: 32.73 KG/M2

## 2021-05-10 DIAGNOSIS — M21.372 FOOT DROP, BILATERAL: ICD-10-CM

## 2021-05-10 DIAGNOSIS — K63.5 POLYP OF COLON, UNSPECIFIED PART OF COLON, UNSPECIFIED TYPE: ICD-10-CM

## 2021-05-10 DIAGNOSIS — F17.200 TOBACCO USE DISORDER: ICD-10-CM

## 2021-05-10 DIAGNOSIS — I10 ESSENTIAL HYPERTENSION, BENIGN: ICD-10-CM

## 2021-05-10 DIAGNOSIS — E78.2 MIXED HYPERLIPIDEMIA: ICD-10-CM

## 2021-05-10 DIAGNOSIS — Z00.00 ADULT GENERAL MEDICAL EXAM: Primary | ICD-10-CM

## 2021-05-10 DIAGNOSIS — Z12.11 SCREENING FOR COLON CANCER: ICD-10-CM

## 2021-05-10 DIAGNOSIS — M21.371 FOOT DROP, BILATERAL: ICD-10-CM

## 2021-05-10 DIAGNOSIS — F17.210 CIGARETTE SMOKER: ICD-10-CM

## 2021-05-10 DIAGNOSIS — H91.91 DECREASED HEARING, RIGHT: ICD-10-CM

## 2021-05-10 DIAGNOSIS — G47.33 OBSTRUCTIVE SLEEP APNEA SYNDROME: ICD-10-CM

## 2021-05-10 DIAGNOSIS — R20.0 NUMBNESS OF LEGS: ICD-10-CM

## 2021-05-10 DIAGNOSIS — J30.89 OTHER ALLERGIC RHINITIS: ICD-10-CM

## 2021-05-10 PROCEDURE — 99396 PREV VISIT EST AGE 40-64: CPT | Performed by: FAMILY MEDICINE

## 2021-05-10 PROCEDURE — 3078F DIAST BP <80 MM HG: CPT | Performed by: FAMILY MEDICINE

## 2021-05-10 PROCEDURE — 99406 BEHAV CHNG SMOKING 3-10 MIN: CPT | Performed by: FAMILY MEDICINE

## 2021-05-10 PROCEDURE — 3008F BODY MASS INDEX DOCD: CPT | Performed by: FAMILY MEDICINE

## 2021-05-10 PROCEDURE — 3074F SYST BP LT 130 MM HG: CPT | Performed by: FAMILY MEDICINE

## 2021-05-10 PROCEDURE — 99213 OFFICE O/P EST LOW 20 MIN: CPT | Performed by: FAMILY MEDICINE

## 2021-05-10 RX ORDER — HYDROCHLOROTHIAZIDE 12.5 MG/1
12.5 TABLET ORAL DAILY
Qty: 90 TABLET | Refills: 1 | Status: SHIPPED | OUTPATIENT
Start: 2021-05-10 | End: 2021-11-30

## 2021-05-10 NOTE — PROGRESS NOTES
Patient ID: Wanda Mathew is a 63year old male. HPI  Patient presents with:  Routine Physical    Last physical on 2/22/2020. Pt states he smokes 1 pack of cigarettes every couple of days. Pt reports smoking is a part of his daily routine.  He is Component Value Date    WBC 14.1 (H) 09/19/2020    RBC 4.08 (L) 09/19/2020    HGB 13.5 09/19/2020    HCT 40.0 09/19/2020    .0 09/19/2020    MPV 9.0 03/04/2017    MCV 98.0 09/19/2020    MCH 33.1 09/19/2020    MCHC 33.8 09/19/2020    RDW 13.2 09/19 CHOLHDLRATIO 3.5 02/08/2020    CALCNONHDL 123 04/11/2015    CALCNONHDL 165 (H) 10/10/2014    CALCNONHDL 164 (H) 08/30/2013     TSH (mIU/L)   Date Value   03/03/2018 0.90     TSH W/REFLEX TO FT4 (mIU/L)   Date Value   02/08/2020 1.00     Lab Results   [place] Marital status:       Spouse name: Not on file      Number of children: Not on file      Years of education: Not on file      Highest education level: Not on file    Occupational History      Not on file    Tobacco Use      Smoking status: Current E Exercise per Session:   Stress:       Feeling of Stress :   Social Connections:       Frequency of Communication with Friends and Family:       Frequency of Social Gatherings with Friends and Family:       Attends Christian Services:       Active Member of Lymphadenopathy: He has no cervical adenopathy. Neurological: He is alert and oriented to person, place, and time. He has normal reflexes. No cranial nerve deficit. Skin: Skin is warm and dry. No rash noted.    Psychiatric: He has a normal mood and af Encounter      GI Referral - 1st Available Provider          Order Comments:              Gastroenterology Department phone number is [phone].  ++++ Ask for first available provider that can see you ++++.           Referral Priority:Routine          Re change, supplemented with adjunctive medical treatments when appropriate. Additionally, national quitting tobacco aides were discussed. Arrange:  Follow up at next visit- see specific follow up below:    Time Counseled: more than 3 minutes (up to 10 total

## 2021-05-19 ENCOUNTER — TELEPHONE (OUTPATIENT)
Dept: FAMILY MEDICINE CLINIC | Facility: CLINIC | Age: 66
End: 2021-05-19

## 2021-05-19 NOTE — TELEPHONE ENCOUNTER
Pt calling wanting to know his blood test results from yesterday. Pt was informed of message below and he verbalized understanding. Test results now post immediately to your [employer] account.   However, your provider may not have the chance to review or

## 2021-06-02 DIAGNOSIS — I10 ESSENTIAL HYPERTENSION, BENIGN: ICD-10-CM

## 2021-06-02 NOTE — TELEPHONE ENCOUNTER
Per patient he is ready to quit smoking and Dr Harika Lynne told him just to call and he will prescribe him the Chantix. Please send to his pharmacy on file. Please advise.

## 2021-06-03 RX ORDER — LOSARTAN POTASSIUM 25 MG/1
TABLET ORAL
Qty: 90 TABLET | Refills: 1 | Status: SHIPPED | OUTPATIENT
Start: 2021-06-03 | End: 2021-09-15

## 2021-06-03 NOTE — TELEPHONE ENCOUNTER
A starter pack has been sent for 1 month and then 5 months of the continuation pack has been sent that you will start after completing the \"starter pack\".

## 2021-08-05 ENCOUNTER — TELEPHONE (OUTPATIENT)
Dept: FAMILY MEDICINE CLINIC | Facility: CLINIC | Age: 66
End: 2021-08-05

## 2021-08-05 DIAGNOSIS — G47.33 OSA (OBSTRUCTIVE SLEEP APNEA): Primary | ICD-10-CM

## 2021-08-05 NOTE — TELEPHONE ENCOUNTER
Pt calling regarding an recall on his current CPAP machine and he states the company basically told them they can't do anything for him. Pt contacted insurance and they told pt to contact PCP.    Pt requesting advisement on if he can get a new machine from

## 2021-08-05 NOTE — TELEPHONE ENCOUNTER
Dr. Garfield Bateman, [facility] [employer] is requesting an order for In-line bacteria filter order if you would like to have patient continue to use the CPAP machine until [facility] provides a replacement machine.

## 2021-08-06 ENCOUNTER — TELEPHONE (OUTPATIENT)
Dept: FAMILY MEDICINE CLINIC | Facility: CLINIC | Age: 66
End: 2021-08-06

## 2021-08-06 NOTE — TELEPHONE ENCOUNTER
Advised patient an order has been completed for a filter and we will fax. Number provided to patient to follow up with [facility].

## 2021-08-06 NOTE — TELEPHONE ENCOUNTER
If his pain doctor will not fill it out for him, I will. Fill out the handicap form with his name and my office information on it and put it on my desk for Monday so I do not forget please.

## 2021-08-06 NOTE — TELEPHONE ENCOUNTER
Per patient he would like to talk to nurses or doctor what's going about the recall Cpap. Please advise.

## 2021-08-06 NOTE — TELEPHONE ENCOUNTER
Patient reports he currently has a temp handicap parking sticker, he got it after his surgery, it will  and he's requesting if you are able to complete the application for a permanent sticker.  Please advise if patient would need an appt and if the of

## 2021-08-09 ENCOUNTER — MED REC SCAN ONLY (OUTPATIENT)
Dept: FAMILY MEDICINE CLINIC | Facility: CLINIC | Age: 66
End: 2021-08-09

## 2021-08-09 NOTE — TELEPHONE ENCOUNTER
First attempt. Left message for patient to call back. If patient calls back please advice form requested for placard has been completed and left at [facility] second floor for .  Thank you

## 2021-08-09 NOTE — TELEPHONE ENCOUNTER
Patient stated that [facility] did not get the paperwork the was faxed. He is not an established patient with [facility]. Need order, insurance/demographics, office visit, sleep study results. Spoke to [place] will fax everything over.

## 2021-08-09 NOTE — TELEPHONE ENCOUNTER
Spoke to [facility] patient is new to [facility]. Demographics, Insurance, DME order, results and office notes faxed to [place].

## 2021-08-09 NOTE — TELEPHONE ENCOUNTER
Verified name and . Patient calling to report that he spoke with [facility] and that they stated that they still have not received the requested documents. Triage support, please assist and thank you.

## 2021-08-10 NOTE — TELEPHONE ENCOUNTER
Called [facility], was informed order was not received. Demographics, Insurance, DME order, results and office notes faxed to [place] several times. Called [facility], spoke to [facility] who was able to find the order faxed yesterday.  [facility] will have order

## 2021-08-10 NOTE — TELEPHONE ENCOUNTER
Patient calling, angry that his cpap information has not been faxed.  Advised patient of all notes below and that we have faxed with confirmation of received faxes and advised him that our staff is currently working on this and Delorise Maria Elena griffiths

## 2021-08-17 NOTE — TELEPHONE ENCOUNTER
The patient calling to state that \"Jannette is responsible if anything happens to me. \"    The patient is upset because he has not received information about the cpap machine.   He has called [facility] and was told they cannot give out the [facility]

## 2021-08-17 NOTE — TELEPHONE ENCOUNTER
Patient is calling to follow up and is requesting call back from nurse regarding order for CPAP machine.

## 2021-08-18 ENCOUNTER — TELEPHONE (OUTPATIENT)
Dept: FAMILY MEDICINE CLINIC | Facility: CLINIC | Age: 66
End: 2021-08-18

## 2021-08-18 DIAGNOSIS — F17.200 TOBACCO USE DISORDER: ICD-10-CM

## 2021-08-18 RX ORDER — VARENICLINE TARTRATE 1 MG/1
1 TABLET, FILM COATED ORAL 2 TIMES DAILY
Qty: 60 TABLET | Refills: 0 | Status: SHIPPED | OUTPATIENT
Start: 2021-08-18 | End: 2021-12-03

## 2021-08-18 RX ORDER — VARENICLINE TARTRATE 1 MG/1
TABLET, FILM COATED ORAL
Qty: 180 TABLET | Refills: 1 | OUTPATIENT
Start: 2021-08-18

## 2021-08-18 NOTE — TELEPHONE ENCOUNTER
Patient informed of Gussie Holstein will be the one to handle ALL recall supplies. Patient is very upset since he has contacted Gussie Holstein several times without success. Phone just rings. Patient will continue to follow up with Gussie Holstein.

## 2021-08-19 DIAGNOSIS — F17.200 TOBACCO USE DISORDER: ICD-10-CM

## 2021-08-19 DIAGNOSIS — E78.2 MIXED HYPERLIPIDEMIA: ICD-10-CM

## 2021-08-19 RX ORDER — VARENICLINE TARTRATE 1 MG/1
TABLET, FILM COATED ORAL
Qty: 180 TABLET | Refills: 1 | OUTPATIENT
Start: 2021-08-19

## 2021-08-19 RX ORDER — ROSUVASTATIN CALCIUM 10 MG/1
TABLET, COATED ORAL
Qty: 90 TABLET | Refills: 1 | Status: SHIPPED | OUTPATIENT
Start: 2021-08-19 | End: 2021-12-03

## 2021-08-19 NOTE — TELEPHONE ENCOUNTER
Refill passed per [employer] [facility] protocol.   Requested Prescriptions   Pending Prescriptions Disp Refills    CHANTIX 1 MG Oral Tab [Pharmacy Med Name: Ameena Wolverton 1 MG TABLET] 180 tablet 1     Sig: TAKE 1 TABLET BY MOUTH TWICE A DAY        There is no refill protocol information for this order        ROSUVASTATIN 10 MG Oral Tab [Pharmacy Med Name: ROSUVASTATIN CALCIUM 10 MG TAB] 90 tablet 1     Sig: TAKE 1 TABLET BY MOUTH EVERY DAY EVERY NIGHT        Cholesterol Medication Protocol Passed - 8/19/2021 12:32 PM        Passed - ALT in past 12 months        Passed - LDL in past 12 months        Passed - Last ALT < 80       Lab Results   Component Value Date    ALT 39 05/18/2021             Passed - Last LDL < 130     Lab Results   Component Value Date    LDL 80 05/18/2021             Passed - Appointment in past 12 or next 3 months              Recent Outpatient Visits              3 months ago Adult general medical exam    [place] [employer] [place], [facility], [address], [address], [place], DO    Office Visit    11 months ago Preop testing    [address] [facility]    Nurse Only    11 months ago Acute right lumbar radiculopathy    [facility], [address], [address] [facility] Charanjit, [place], DO    Office Visit    11 months ago Chronic right-sided low back pain with right-sided sciatica    [place] [facility], [address], [address], [place], DO    Office Visit    11 months ago Acute bilateral low back pain with bilateral sciatica    Logan Denton MD    Office Visit

## 2021-08-31 ENCOUNTER — TELEMEDICINE (OUTPATIENT)
Dept: FAMILY MEDICINE CLINIC | Facility: CLINIC | Age: 66
End: 2021-08-31
Payer: COMMERCIAL

## 2021-08-31 ENCOUNTER — NURSE TRIAGE (OUTPATIENT)
Dept: FAMILY MEDICINE CLINIC | Facility: CLINIC | Age: 66
End: 2021-08-31

## 2021-08-31 DIAGNOSIS — R58 BLEEDING: Primary | ICD-10-CM

## 2021-08-31 PROCEDURE — 99214 OFFICE O/P EST MOD 30 MIN: CPT | Performed by: NURSE PRACTITIONER

## 2021-08-31 NOTE — TELEPHONE ENCOUNTER
Action Requested: Summary for Provider     []  Critical Lab, Recommendations Needed  [] Need Additional Advice  [x]   FYI    []   Need Orders  [] Need Medications Sent to Pharmacy  []  Other     SUMMARY:   Spoke with pt,  verified, pt stated he takes 2

## 2021-08-31 NOTE — PROGRESS NOTES
HPI    Virtual Telephone/Video Check-In    Karthik Barksdale verbally consents to a Virtual/Telephone Check-In visit on 08/31/21. Patient has been referred to the [facility] website at [email or web address] to review the yearly Consent to Treat document. tendon at elbow Bilateral        Family History   Problem Relation Age of Onset   • Diabetes Father    • Stroke Father 68       Social History    Socioeconomic History      Marital status:       Spouse name: Not on file      Number of children: Not Transportation Needs:       Lack of Transportation (Medical):       Lack of Transportation (Non-Medical):   Physical Activity:       Days of Exercise per Week:       Minutes of Exercise per Session:   Stress:       Feeling of Stress :   Social Connection Patient urged to stop ibuprofen at least 10 days prior to going back for tattoo. Urged to stop taking ibuprofen due to the risk of stomach bleeding, may alternate between tylenol. Discussed plan of care with patient and patient is in agreement.

## 2021-09-15 DIAGNOSIS — I10 ESSENTIAL HYPERTENSION, BENIGN: ICD-10-CM

## 2021-09-15 NOTE — TELEPHONE ENCOUNTER
Refill passed per [address] Protocol    Requested Prescriptions   Pending Prescriptions Disp Refills    LOSARTAN POTASSIUM 25 MG Oral Tab [Pharmacy Med Name: LOSARTAN POTASSIUM 25 MG TAB] 90 tablet 1     Sig: TAKE 1 TABLET BY MOUTH EVERY DAY        H

## 2021-09-16 RX ORDER — LOSARTAN POTASSIUM 25 MG/1
25 TABLET ORAL DAILY
Qty: 90 TABLET | Refills: 1 | Status: SHIPPED | OUTPATIENT
Start: 2021-09-16 | End: 2021-12-03

## 2021-11-29 DIAGNOSIS — I10 ESSENTIAL HYPERTENSION, BENIGN: ICD-10-CM

## 2021-11-30 RX ORDER — HYDROCHLOROTHIAZIDE 12.5 MG/1
12.5 TABLET ORAL DAILY
Qty: 30 TABLET | Refills: 0 | Status: SHIPPED | OUTPATIENT
Start: 2021-11-30 | End: 2021-12-03

## 2021-12-01 ENCOUNTER — NURSE TRIAGE (OUTPATIENT)
Dept: FAMILY MEDICINE CLINIC | Facility: CLINIC | Age: 66
End: 2021-12-01

## 2021-12-01 ENCOUNTER — TELEPHONE (OUTPATIENT)
Dept: FAMILY MEDICINE CLINIC | Facility: CLINIC | Age: 66
End: 2021-12-01

## 2021-12-01 NOTE — TELEPHONE ENCOUNTER
Action Requested: Summary for Provider     []  Critical Lab, Recommendations Needed  [] Need Additional Advice  []   FYI    []   Need Orders  [] Need Medications Sent to Pharmacy  []  Other     SUMMARY: patient calling with sore throat, nasal congestion, m

## 2021-12-02 NOTE — TELEPHONE ENCOUNTER
Current Outpatient Medications:   •  Losartan Potassium 25 MG Oral Tab, Take 1 tablet (25 mg total) by mouth daily. , Disp: 90 tablet, Rfl: 1

## 2021-12-02 NOTE — TELEPHONE ENCOUNTER
Disp Refills Start End    Losartan Potassium 25 MG Oral Tab 90 tablet 1 9/16/2021     Sig - Route:  Take 1 tablet (25 mg total) by mouth daily. - Oral    Sent to pharmacy as: Losartan Potassium 25 MG Oral Tablet (COZAAR)    E-Prescribing Status: Receipt co

## 2021-12-03 ENCOUNTER — TELEPHONE (OUTPATIENT)
Dept: FAMILY MEDICINE CLINIC | Facility: CLINIC | Age: 66
End: 2021-12-03

## 2021-12-03 DIAGNOSIS — E78.2 MIXED HYPERLIPIDEMIA: ICD-10-CM

## 2021-12-03 DIAGNOSIS — I10 ESSENTIAL HYPERTENSION, BENIGN: ICD-10-CM

## 2021-12-03 RX ORDER — LOSARTAN POTASSIUM 25 MG/1
25 TABLET ORAL DAILY
Qty: 90 TABLET | Refills: 1 | Status: SHIPPED | OUTPATIENT
Start: 2021-12-03 | End: 2021-12-10

## 2021-12-03 RX ORDER — HYDROCHLOROTHIAZIDE 12.5 MG/1
12.5 TABLET ORAL DAILY
Qty: 90 TABLET | Refills: 1 | Status: SHIPPED | OUTPATIENT
Start: 2021-12-03 | End: 2021-12-10

## 2021-12-03 RX ORDER — ROSUVASTATIN CALCIUM 10 MG/1
10 TABLET, COATED ORAL NIGHTLY
Qty: 90 TABLET | Refills: 1 | Status: SHIPPED | OUTPATIENT
Start: 2021-12-03 | End: 2021-12-10

## 2021-12-03 NOTE — TELEPHONE ENCOUNTER
Current Outpatient Medications:   •  hydrochlorothiazide 12.5 MG Oral Tab, Take 1 tablet (12.5 mg total) by mouth daily. , Disp: 30 tablet, Rfl: 0  •  Losartan Potassium 25 MG Oral Tab, Take 1 tablet (25 mg total) by mouth daily. , Disp: 90 tablet, Rfl: 1

## 2021-12-04 RX ORDER — VARENICLINE TARTRATE 1 MG/1
1 TABLET, FILM COATED ORAL 2 TIMES DAILY
Qty: 180 TABLET | Refills: 0 | Status: SHIPPED | OUTPATIENT
Start: 2021-12-04 | End: 2021-12-10

## 2021-12-04 NOTE — TELEPHONE ENCOUNTER
Refill passed per [employer] [place], [facility] protocol. Requested Prescriptions   Pending Prescriptions Disp Refills    hydrochlorothiazide 12.5 MG Oral Tab 30 tablet 5     Sig: Take 1 tablet (12.5 mg total) by mouth daily. Hypertensive Medications Protocol Passed - 12/3/2021  8:51 AM        Passed - CMP or BMP in past 12 months        Passed - Appointment in past 6 or next 3 months        Passed - GFR Non- > 50     Lab Results   Component Value Date    GFRNAA 79 05/18/2021                    losartan 25 MG Oral Tab 90 tablet 1     Sig: Take 1 tablet (25 mg total) by mouth daily. Hypertensive Medications Protocol Passed - 12/3/2021  8:51 AM        Passed - CMP or BMP in past 12 months        Passed - Appointment in past 6 or next 3 months        Passed - GFR Non- > 50     Lab Results   Component Value Date    GFRNAA 79 05/18/2021                    rosuvastatin 10 MG Oral Tab 90 tablet 1     Sig: Take 1 tablet (10 mg total) by mouth nightly. Cholesterol Medication Protocol Passed - 12/3/2021  8:51 AM        Passed - ALT in past 12 months        Passed - LDL in past 12 months        Passed - Last ALT < 80       Lab Results   Component Value Date    ALT 39 05/18/2021             Passed - Last LDL < 130     Lab Results   Component Value Date    LDL 80 05/18/2021               Passed - Appointment in past 12 or next 3 months           varenicline 1 MG Oral Tab 60 tablet 0     Sig: Take 1 tablet (1 mg total) by mouth 2 (two) times daily.         There is no refill protocol information for this order         Future Appointments         Provider Department Appt Notes    In 1 week [address] [facility], [place], [address] [facility], [facility] [address], [place] medication f/u          Recent Outpatient Visits              3 months ago Bleeding    [place] [employer] [place], [facility], [address], [address]    6 months ago Adult general medical exam    [facility], [facility], [address], [address] [facility] Otcavio Suarez, DO    Office Visit    1 year ago Preop testing    [address]    Nurse Only    1 year ago Acute right lumbar radiculopathy    [address], [place] Vang, DO    Office Visit    1 year ago Chronic right-sided low back pain with right-sided sciatica    [address], [place], DO    Office Visit

## 2021-12-04 NOTE — TELEPHONE ENCOUNTER
Refill passed per [facility] protocol. Requested Prescriptions   Pending Prescriptions Disp Refills    hydrochlorothiazide 12.5 MG Oral Tab 30 tablet 0     Sig: Take 1 tablet (12.5 mg total) by mouth daily. Hypertensive Medications Protocol Passed - 12/3/2021  8:51 AM        Passed - CMP or BMP in past 12 months        Passed - Appointment in past 6 or next 3 months        Passed - GFR Non- > 50     Lab Results   Component Value Date    GFRNAA 79 05/18/2021                    losartan 25 MG Oral Tab 90 tablet 1     Sig: Take 1 tablet (25 mg total) by mouth daily. Hypertensive Medications Protocol Passed - 12/3/2021  8:51 AM        Passed - CMP or BMP in past 12 months        Passed - Appointment in past 6 or next 3 months        Passed - GFR Non- > 50     Lab Results   Component Value Date    GFRNAA 79 05/18/2021                    rosuvastatin 10 MG Oral Tab 90 tablet 1     Sig: Take 1 tablet (10 mg total) by mouth nightly. Cholesterol Medication Protocol Passed - 12/3/2021  8:51 AM        Passed - ALT in past 12 months        Passed - LDL in past 12 months        Passed - Last ALT < 80       Lab Results   Component Value Date    ALT 39 05/18/2021             Passed - Last LDL < 130     Lab Results   Component Value Date    LDL 80 05/18/2021               Passed - Appointment in past 12 or next 3 months           varenicline 1 MG Oral Tab 60 tablet 0     Sig: Take 1 tablet (1 mg total) by mouth 2 (two) times daily.         There is no refill protocol information for this order         Future Appointments         Provider Department Appt Notes    In 1 week Renie Closs, [place], [address] [facility], [facility], [place] medication f/u          Recent Outpatient Visits              3 months ago Bleeding    [facility], [facility], [address]    6 months ago Adult general medical exam    [facility], [facility], [address] [facility] Jeanne Alexis, DO    Office Visit    1 year ago Preop testing    [address] [place]    Nurse Only    1 year ago Acute right lumbar radiculopathy    [facility], [address], [facility], DO    Office Visit    1 year ago Chronic right-sided low back pain with right-sided sciatica    [address], [place], DO    Office Visit

## 2021-12-10 ENCOUNTER — OFFICE VISIT (OUTPATIENT)
Dept: FAMILY MEDICINE CLINIC | Facility: CLINIC | Age: 66
End: 2021-12-10
Payer: COMMERCIAL

## 2021-12-10 VITALS
HEART RATE: 59 BPM | DIASTOLIC BLOOD PRESSURE: 79 MMHG | SYSTOLIC BLOOD PRESSURE: 140 MMHG | HEIGHT: 70 IN | TEMPERATURE: 99 F | WEIGHT: 221 LBS | BODY MASS INDEX: 31.64 KG/M2

## 2021-12-10 DIAGNOSIS — Z72.0 TOBACCO USE: ICD-10-CM

## 2021-12-10 DIAGNOSIS — M54.50 CHRONIC BILATERAL LOW BACK PAIN, UNSPECIFIED WHETHER SCIATICA PRESENT: ICD-10-CM

## 2021-12-10 DIAGNOSIS — I10 ESSENTIAL HYPERTENSION, BENIGN: ICD-10-CM

## 2021-12-10 DIAGNOSIS — Z12.11 SCREENING FOR COLON CANCER: ICD-10-CM

## 2021-12-10 DIAGNOSIS — E78.2 MIXED HYPERLIPIDEMIA: ICD-10-CM

## 2021-12-10 DIAGNOSIS — G89.29 CHRONIC BILATERAL LOW BACK PAIN, UNSPECIFIED WHETHER SCIATICA PRESENT: ICD-10-CM

## 2021-12-10 DIAGNOSIS — Z98.890 HISTORY OF BACK SURGERY: ICD-10-CM

## 2021-12-10 DIAGNOSIS — K42.9 UMBILICAL HERNIA WITHOUT OBSTRUCTION OR GANGRENE: Primary | ICD-10-CM

## 2021-12-10 PROCEDURE — 3077F SYST BP >= 140 MM HG: CPT | Performed by: FAMILY MEDICINE

## 2021-12-10 PROCEDURE — 3078F DIAST BP <80 MM HG: CPT | Performed by: FAMILY MEDICINE

## 2021-12-10 PROCEDURE — 3008F BODY MASS INDEX DOCD: CPT | Performed by: FAMILY MEDICINE

## 2021-12-10 PROCEDURE — 99215 OFFICE O/P EST HI 40 MIN: CPT | Performed by: FAMILY MEDICINE

## 2021-12-10 RX ORDER — HYDROCHLOROTHIAZIDE 12.5 MG/1
12.5 TABLET ORAL DAILY
Qty: 90 TABLET | Refills: 3 | Status: SHIPPED | OUTPATIENT
Start: 2021-12-10

## 2021-12-10 RX ORDER — ROSUVASTATIN CALCIUM 10 MG/1
10 TABLET, COATED ORAL NIGHTLY
Qty: 90 TABLET | Refills: 3 | Status: SHIPPED | OUTPATIENT
Start: 2021-12-10

## 2021-12-10 RX ORDER — LOSARTAN POTASSIUM 25 MG/1
25 TABLET ORAL DAILY
Qty: 90 TABLET | Refills: 3 | Status: SHIPPED | OUTPATIENT
Start: 2021-12-10

## 2021-12-10 RX ORDER — BUPROPION HYDROCHLORIDE 150 MG/1
TABLET, EXTENDED RELEASE ORAL
Qty: 180 TABLET | Refills: 1 | Status: SHIPPED | OUTPATIENT
Start: 2021-12-10

## 2021-12-10 NOTE — PROGRESS NOTES
Patient ID: Aram Vargas is a 63year old male. HPI  Patient presents with: Follow - Up: Hypertension - Needs 90 day refills   Other: Discomfort around umbilical region   Smoking: tobacco cessation    Last seen by me on 5/10/2021.      Pt c/o inte a moments notice\" due to his stress. I discussed Wellbutrin with him. Pt would like to know if his referral for a colonoscopy is still current.  I am reprinting his referral.     Wt Readings from Last 6 Encounters:  12/10/21 : 221 lb  05/10/21 : 228 lb mg total) by mouth nightly. 90 tablet 1   • Cholecalciferol 125 MCG (5000 UT) Oral Tab Take 1 tablet by mouth daily. • varenicline 1 MG Oral Tab Take 1 tablet (1 mg total) by mouth 2 (two) times daily.  (Patient not taking: Reported on 12/10/2021) 180 t total) by mouth daily. -     losartan 25 MG Oral Tab; Take 1 tablet (25 mg total) by mouth daily. I even sent in the medications again for all year with 90-day supply each time.   Chronic bilateral low back pain, unspecified whether sciatica present  Unfo reviewed the chart and discharge instructions (if applicable) and agree that the record reflects my personal performance and is accurate and complete.   Zabrina Interiano, , 12/11/2021, 12:41 AM

## 2022-04-01 ENCOUNTER — TELEPHONE (OUTPATIENT)
Dept: FAMILY MEDICINE CLINIC | Facility: CLINIC | Age: 67
End: 2022-04-01

## 2022-04-01 RX ORDER — HYDROCHLOROTHIAZIDE 12.5 MG/1
12.5 TABLET ORAL DAILY
Qty: 90 TABLET | Refills: 2 | Status: SHIPPED | OUTPATIENT
Start: 2022-04-01

## 2022-04-01 RX ORDER — HYDROCHLOROTHIAZIDE 12.5 MG/1
12.5 TABLET ORAL DAILY
Qty: 90 TABLET | Refills: 1 | Status: SHIPPED | OUTPATIENT
Start: 2022-04-01 | End: 2022-04-01

## 2022-04-01 NOTE — TELEPHONE ENCOUNTER
Pt calling back to make sure the below refill will be for a full 270 tablets so he will have 3 refills in total to match his other medications, so he runs out of them all at the same time and won't have to call back. Please advise.

## 2022-04-01 NOTE — TELEPHONE ENCOUNTER
Patient states [facility] states there are no further refills for hydrochlorothiazide 12.5 mg even though ordered on 12/10/21 with 3 refills. Resent medication #90 with 1 refill.

## 2022-05-14 ENCOUNTER — NURSE ONLY (OUTPATIENT)
Dept: GASTROENTEROLOGY | Facility: CLINIC | Age: 67
End: 2022-05-14

## 2022-05-14 DIAGNOSIS — Z12.11 SCREEN FOR COLON CANCER: Primary | ICD-10-CM

## 2022-05-14 NOTE — PROGRESS NOTES
Dr. Janna Dave,     Called patient for a scheduled telephone colon screening. GI MD preference: none   Insurance:  BCBS  CBC from: 5/18/2021 show no signs of anemia   PCP visit on: 12/10/2021     Last Procedure, Date, MD:  Over 10 years ago does not recall location/MD    Anticoagulants: no  Diabetic Meds: no   BP meds(Ace inhibitors/ARB's): LOSARTAN  Weight loss meds (phentermine/vyvanse): no   Iron supplement (RX/OTC): no  Height & Weight/BMI: 5'10\"/215lbs/30  Hx of Cardiac/CVA issues/(MI/Stroke): no  Devices Pacemaker/Defibrillator/Stents: no  Resp. Issues/Oxygen Use/JAMILAH/COPD: JAMILAH w/CPAP does not use it since RECALL  Issues w/Anesthesia: no     Symptoms (Y/N): no     Family history of colon cancer: no     Medications, pharmacy, and allergies verified with patient over the phone. Please advise on orders and prep, thank you.

## 2022-05-18 ENCOUNTER — TELEPHONE (OUTPATIENT)
Dept: FAMILY MEDICINE CLINIC | Facility: CLINIC | Age: 67
End: 2022-05-18

## 2022-05-18 NOTE — TELEPHONE ENCOUNTER
Pt states he saw his eye doctor yesterday and recommended Pt to take Preservision Vitamin supplements (AREDS 2 formula). Pt would like to make sure this is ok to take with his other medications, and will not interact with them. Current active medication on chart is correct. Please advise.

## 2022-06-10 NOTE — PROGRESS NOTES
GI Staff:     Please schedule: CLN with MAC. Please pend split dose Golytely bowel prep.      Diagnosis: screening    Medication adjustments:  Day before procedure, hold: none  Day of procedure, hold: losartan

## 2022-06-10 NOTE — PROGRESS NOTES
Original TCS appointment sent to Dr. Honorio Varner on 5/14/2022 & 5/27/2022 still does not have orders; approval given from supervisor to route to alternate provider to obtain orders. Dr. Jenny Omalley -    Please see below and provide orders, thank you!

## 2022-06-14 NOTE — PROGRESS NOTES
Scheduled for:  Colonoscopy-screen [address]    Provider Name:  Dr. Asim Garnett  Date:  9/6/2022  Location:  [facility]  Sedation:  MAC  Time:  1:15 pm (pt is aware to arrive at 12:15 pm)  Prep:  Split dose golytely (orders pended; msg routed to MD)   Meds/Allergies Reconciled?:  Physician reviewed  Diagnosis with codes:  Colorectal cancer screening Z12.11  Was patient informed to call insurance with codes (Y/N):  I confirmed [employer] with this patient. Referral sent?:  Referral was sent at the time of electronic surgical scheduling. [facility] or [address] notified?:  I sent an electronic request to Endo Scheduling and received a confirmation today. Medication Orders:  HOLD LOSARTAN THE MORNING OF PROCEDURE   Misc Orders:  Patient was informed that they will need a COVID 19 test prior to their procedure. Patient verbally understood & will await a phone call from [facility] to schedule. Further instructions given by staff: I discussed the prep instructions with the patient which he verbally understood and is aware that I will send the instructions today via [address]

## 2022-07-05 ENCOUNTER — TELEPHONE (OUTPATIENT)
Dept: FAMILY MEDICINE CLINIC | Facility: CLINIC | Age: 67
End: 2022-07-05

## 2022-07-05 NOTE — TELEPHONE ENCOUNTER
Patient wanted to make doctor aware CPap company will be contacting office to request settings for CPap.

## 2022-09-06 ENCOUNTER — HOSPITAL ENCOUNTER (OUTPATIENT)
Facility: HOSPITAL | Age: 67
Setting detail: HOSPITAL OUTPATIENT SURGERY
Discharge: HOME | End: 2022-09-06
Attending: INTERNAL MEDICINE | Admitting: INTERNAL MEDICINE
Payer: COMMERCIAL

## 2022-09-06 ENCOUNTER — ANESTHESIA EVENT (OUTPATIENT)
Dept: ENDOSCOPY | Facility: HOSPITAL | Age: 67
End: 2022-09-06
Payer: COMMERCIAL

## 2022-09-06 ENCOUNTER — ANESTHESIA (OUTPATIENT)
Dept: ENDOSCOPY | Facility: HOSPITAL | Age: 67
End: 2022-09-06
Payer: COMMERCIAL

## 2022-09-06 VITALS
RESPIRATION RATE: 18 BRPM | HEIGHT: 70 IN | OXYGEN SATURATION: 97 % | DIASTOLIC BLOOD PRESSURE: 75 MMHG | WEIGHT: 215 LBS | SYSTOLIC BLOOD PRESSURE: 123 MMHG | BODY MASS INDEX: 30.78 KG/M2 | HEART RATE: 62 BPM | TEMPERATURE: 98 F

## 2022-09-06 DIAGNOSIS — Z12.11 SCREEN FOR COLON CANCER: ICD-10-CM

## 2022-09-06 PROCEDURE — 0DBK8ZX EXCISION OF ASCENDING COLON, VIA NATURAL OR ARTIFICIAL OPENING ENDOSCOPIC, DIAGNOSTIC: ICD-10-PCS | Performed by: INTERNAL MEDICINE

## 2022-09-06 PROCEDURE — 0DBM8ZX EXCISION OF DESCENDING COLON, VIA NATURAL OR ARTIFICIAL OPENING ENDOSCOPIC, DIAGNOSTIC: ICD-10-PCS | Performed by: INTERNAL MEDICINE

## 2022-09-06 PROCEDURE — 88305 TISSUE EXAM BY PATHOLOGIST: CPT | Performed by: INTERNAL MEDICINE

## 2022-09-06 RX ORDER — LIDOCAINE HYDROCHLORIDE 10 MG/ML
INJECTION, SOLUTION EPIDURAL; INFILTRATION; INTRACAUDAL; PERINEURAL AS NEEDED
Status: DISCONTINUED | OUTPATIENT
Start: 2022-09-06 | End: 2022-09-06 | Stop reason: SURG

## 2022-09-06 RX ORDER — SODIUM CHLORIDE, SODIUM LACTATE, POTASSIUM CHLORIDE, CALCIUM CHLORIDE 600; 310; 30; 20 MG/100ML; MG/100ML; MG/100ML; MG/100ML
INJECTION, SOLUTION INTRAVENOUS CONTINUOUS
Status: DISCONTINUED | OUTPATIENT
Start: 2022-09-06 | End: 2022-09-06

## 2022-09-06 RX ADMIN — LIDOCAINE HYDROCHLORIDE 50 MG: 10 INJECTION, SOLUTION EPIDURAL; INFILTRATION; INTRACAUDAL; PERINEURAL at 13:25:00

## 2022-09-06 RX ADMIN — SODIUM CHLORIDE, SODIUM LACTATE, POTASSIUM CHLORIDE, CALCIUM CHLORIDE: 600; 310; 30; 20 INJECTION, SOLUTION INTRAVENOUS at 13:23:00

## 2022-09-06 RX ADMIN — SODIUM CHLORIDE, SODIUM LACTATE, POTASSIUM CHLORIDE, CALCIUM CHLORIDE: 600; 310; 30; 20 INJECTION, SOLUTION INTRAVENOUS at 13:49:00

## 2022-09-06 NOTE — ANESTHESIA POSTPROCEDURE EVALUATION
Patient: Ana Laura Butcher    Procedure Summary     Date: 09/06/22 Room / Location: [address] [place] [address] ENDOSCOPY 01 / [address] [place] [address] ENDOSCOPY    Anesthesia Start: 6707 Anesthesia Stop:     Procedure: COLONOSCOPY-SCREENING (N/A ) Diagnosis:       Screen for colon cancer      (polyps, hemorrhoids)    Surgeons: Essie Stanley MD Anesthesiologist: Jose Hemphill CRNA    Anesthesia Type: MAC ASA Status: 2          Anesthesia Type: MAC    Vitals Value Taken Time   BP 94/56 09/06/22 1355   Temp  09/06/22 1356   Pulse 55 09/06/22 1356   Resp 19 09/06/22 1356   SpO2 96 % 09/06/22 1356   Vitals shown include unvalidated device data. [address] [place] [address] AN Post Evaluation:   Patient Evaluated in PACU  Patient Participation: complete - patient participated  Level of Consciousness: awake  Pain Score: 0  Pain Management: adequate  Airway Patency:patent  Dental exam unchanged from preop  Yes    Cardiovascular Status: acceptable and hemodynamically stable  Respiratory Status: acceptable, room air, nonlabored ventilation and spontaneous ventilation  Postoperative Hydration acceptable      Miesha Marinelli CRNA  9/6/2022 1:56 PM

## 2022-09-20 ENCOUNTER — TELEPHONE (OUTPATIENT)
Dept: GASTROENTEROLOGY | Facility: CLINIC | Age: 67
End: 2022-09-20

## 2022-09-21 NOTE — TELEPHONE ENCOUNTER
I mailed out colonoscopy results letter to pt  Updated health maintenance  Entered into 3 yr CLN recall  Recall colon in 3 years per.  Colon done 9/06/2022    Alejo Lacy MD  [facility] Clinical Staff  GI staff: please place recall for colonoscopy in 3 years

## 2023-06-05 PROBLEM — K42.9 UMBILICAL HERNIA WITHOUT OBSTRUCTION AND WITHOUT GANGRENE: Status: ACTIVE | Noted: 2023-06-05

## 2023-08-18 ASSESSMENT — ACTIVITIES OF DAILY LIVING (ADL)
ADL_SHORT_OF_BREATH: NO
HISTORY OF FALLING IN THE LAST YEAR (PRIOR TO ADMISSION): NO
NEEDS_ASSIST: NO
ADL_SCORE: 12
ADL_BEFORE_ADMISSION: INDEPENDENT
SENSORY_SUPPORT_DEVICES: EYEGLASSES;DENTURES;HEARING AIDS

## 2023-09-06 ENCOUNTER — HOSPITAL ENCOUNTER (OUTPATIENT)
Age: 68
Discharge: HOME | End: 2023-09-06
Attending: SURGERY | Admitting: SURGERY

## 2023-09-06 ENCOUNTER — ANESTHESIA EVENT (OUTPATIENT)
Dept: SURGERY | Age: 68
End: 2023-09-06

## 2023-09-06 ENCOUNTER — ANESTHESIA (OUTPATIENT)
Dept: SURGERY | Age: 68
End: 2023-09-06

## 2023-09-06 VITALS
HEIGHT: 70 IN | BODY MASS INDEX: 32.19 KG/M2 | WEIGHT: 224.87 LBS | SYSTOLIC BLOOD PRESSURE: 128 MMHG | HEART RATE: 68 BPM | RESPIRATION RATE: 16 BRPM | TEMPERATURE: 97.3 F | OXYGEN SATURATION: 98 % | DIASTOLIC BLOOD PRESSURE: 69 MMHG

## 2023-09-06 DIAGNOSIS — K42.9 UMBILICAL HERNIA WITHOUT OBSTRUCTION AND WITHOUT GANGRENE: ICD-10-CM

## 2023-09-06 LAB — GLUCOSE BLDC GLUCOMTR-MCNC: 107 MG/DL (ref 70–99)

## 2023-09-06 PROCEDURE — 13000034 HB BASIC CASE  S/U +1ST 15 MIN: Performed by: SURGERY

## 2023-09-06 PROCEDURE — 82962 GLUCOSE BLOOD TEST: CPT

## 2023-09-06 PROCEDURE — 10002803 HB RX 637: Performed by: SURGERY

## 2023-09-06 PROCEDURE — 13000003 HB ANESTHESIA  GENERAL EA ADD MINUTE: Performed by: SURGERY

## 2023-09-06 PROCEDURE — 10002800 HB RX 250 W HCPCS: Performed by: ANESTHESIOLOGY

## 2023-09-06 PROCEDURE — 10002803 HB RX 637: Performed by: ANESTHESIOLOGY

## 2023-09-06 PROCEDURE — 13000002 HB ANESTHESIA  GENERAL  S/U + 1ST 15 MIN: Performed by: SURGERY

## 2023-09-06 PROCEDURE — 10004451 HB PACU RECOVERY 1ST 30 MINUTES: Performed by: SURGERY

## 2023-09-06 PROCEDURE — 13003377

## 2023-09-06 PROCEDURE — 13000001 HB PHASE II RECOVERY EA 30 MINUTES: Performed by: SURGERY

## 2023-09-06 PROCEDURE — 13000035 HB BASIC CASE EA ADD MINUTE: Performed by: SURGERY

## 2023-09-06 PROCEDURE — 10002801 HB RX 250 W/O HCPCS: Performed by: SURGERY

## 2023-09-06 PROCEDURE — 10006023 HB SUPPLY 272: Performed by: SURGERY

## 2023-09-06 PROCEDURE — 10004452 HB PACU ADDL 30 MINUTES: Performed by: SURGERY

## 2023-09-06 PROCEDURE — 10002801 HB RX 250 W/O HCPCS: Performed by: ANESTHESIOLOGY

## 2023-09-06 PROCEDURE — 10002807 HB RX 258: Performed by: ANESTHESIOLOGY

## 2023-09-06 RX ORDER — HYDROCODONE BITARTRATE AND ACETAMINOPHEN 5; 325 MG/1; MG/1
1 TABLET ORAL EVERY 6 HOURS PRN
Qty: 15 TABLET | Refills: 0 | Status: SHIPPED | OUTPATIENT
Start: 2023-09-06 | End: 2023-09-18 | Stop reason: ALTCHOICE

## 2023-09-06 RX ORDER — NEOSTIGMINE METHYLSULFATE 4 MG/4 ML
SYRINGE (ML) INTRAVENOUS PRN
Status: DISCONTINUED | OUTPATIENT
Start: 2023-09-06 | End: 2023-09-06

## 2023-09-06 RX ORDER — ROCURONIUM BROMIDE 10 MG/ML
INJECTION, SOLUTION INTRAVENOUS PRN
Status: DISCONTINUED | OUTPATIENT
Start: 2023-09-06 | End: 2023-09-06

## 2023-09-06 RX ORDER — ONDANSETRON 2 MG/ML
4 INJECTION INTRAMUSCULAR; INTRAVENOUS EVERY 12 HOURS PRN
Status: DISCONTINUED | OUTPATIENT
Start: 2023-09-06 | End: 2023-09-06 | Stop reason: HOSPADM

## 2023-09-06 RX ORDER — DEXTROSE MONOHYDRATE 25 G/50ML
25 INJECTION, SOLUTION INTRAVENOUS PRN
Status: DISCONTINUED | OUTPATIENT
Start: 2023-09-06 | End: 2023-09-06 | Stop reason: HOSPADM

## 2023-09-06 RX ORDER — SODIUM CHLORIDE, SODIUM LACTATE, POTASSIUM CHLORIDE, CALCIUM CHLORIDE 600; 310; 30; 20 MG/100ML; MG/100ML; MG/100ML; MG/100ML
INJECTION, SOLUTION INTRAVENOUS CONTINUOUS
Status: DISCONTINUED | OUTPATIENT
Start: 2023-09-06 | End: 2023-09-06 | Stop reason: HOSPADM

## 2023-09-06 RX ORDER — MIDAZOLAM HYDROCHLORIDE 1 MG/ML
INJECTION, SOLUTION INTRAMUSCULAR; INTRAVENOUS PRN
Status: DISCONTINUED | OUTPATIENT
Start: 2023-09-06 | End: 2023-09-06

## 2023-09-06 RX ORDER — HYDROCODONE BITARTRATE AND ACETAMINOPHEN 5; 325 MG/1; MG/1
1 TABLET ORAL EVERY 4 HOURS PRN
Status: DISCONTINUED | OUTPATIENT
Start: 2023-09-06 | End: 2023-09-06 | Stop reason: HOSPADM

## 2023-09-06 RX ORDER — ONDANSETRON 2 MG/ML
INJECTION INTRAMUSCULAR; INTRAVENOUS PRN
Status: DISCONTINUED | OUTPATIENT
Start: 2023-09-06 | End: 2023-09-06

## 2023-09-06 RX ORDER — 0.9 % SODIUM CHLORIDE 0.9 %
2 VIAL (ML) INJECTION EVERY 12 HOURS SCHEDULED
Status: DISCONTINUED | OUTPATIENT
Start: 2023-09-06 | End: 2023-09-06 | Stop reason: HOSPADM

## 2023-09-06 RX ORDER — DROPERIDOL 2.5 MG/ML
0.62 INJECTION, SOLUTION INTRAMUSCULAR; INTRAVENOUS
Status: DISCONTINUED | OUTPATIENT
Start: 2023-09-06 | End: 2023-09-06 | Stop reason: HOSPADM

## 2023-09-06 RX ORDER — DIPHENHYDRAMINE HYDROCHLORIDE 50 MG/ML
25 INJECTION INTRAMUSCULAR; INTRAVENOUS EVERY 4 HOURS PRN
Status: DISCONTINUED | OUTPATIENT
Start: 2023-09-06 | End: 2023-09-06 | Stop reason: HOSPADM

## 2023-09-06 RX ORDER — PROCHLORPERAZINE EDISYLATE 5 MG/ML
5 INJECTION INTRAMUSCULAR; INTRAVENOUS EVERY 4 HOURS PRN
Status: DISCONTINUED | OUTPATIENT
Start: 2023-09-06 | End: 2023-09-06 | Stop reason: HOSPADM

## 2023-09-06 RX ORDER — GLYCOPYRROLATE 0.2 MG/ML
INJECTION, SOLUTION INTRAMUSCULAR; INTRAVENOUS PRN
Status: DISCONTINUED | OUTPATIENT
Start: 2023-09-06 | End: 2023-09-06

## 2023-09-06 RX ORDER — DEXAMETHASONE SODIUM PHOSPHATE 10 MG/ML
INJECTION, SOLUTION INTRAMUSCULAR; INTRAVENOUS PRN
Status: DISCONTINUED | OUTPATIENT
Start: 2023-09-06 | End: 2023-09-06

## 2023-09-06 RX ORDER — PROPOFOL 10 MG/ML
INJECTION, EMULSION INTRAVENOUS PRN
Status: DISCONTINUED | OUTPATIENT
Start: 2023-09-06 | End: 2023-09-06

## 2023-09-06 RX ORDER — METOCLOPRAMIDE HYDROCHLORIDE 5 MG/ML
5 INJECTION INTRAMUSCULAR; INTRAVENOUS EVERY 6 HOURS PRN
Status: DISCONTINUED | OUTPATIENT
Start: 2023-09-06 | End: 2023-09-06 | Stop reason: HOSPADM

## 2023-09-06 RX ORDER — MAGNESIUM HYDROXIDE 1200 MG/15ML
LIQUID ORAL PRN
Status: DISCONTINUED | OUTPATIENT
Start: 2023-09-06 | End: 2023-09-06 | Stop reason: HOSPADM

## 2023-09-06 RX ORDER — BUPIVACAINE HYDROCHLORIDE AND EPINEPHRINE 5; 5 MG/ML; UG/ML
INJECTION, SOLUTION EPIDURAL; INTRACAUDAL; PERINEURAL PRN
Status: DISCONTINUED | OUTPATIENT
Start: 2023-09-06 | End: 2023-09-06 | Stop reason: HOSPADM

## 2023-09-06 RX ORDER — DIPHENHYDRAMINE HYDROCHLORIDE 50 MG/ML
12.5 INJECTION INTRAMUSCULAR; INTRAVENOUS EVERY 4 HOURS PRN
Status: DISCONTINUED | OUTPATIENT
Start: 2023-09-06 | End: 2023-09-06 | Stop reason: HOSPADM

## 2023-09-06 RX ORDER — ONDANSETRON 2 MG/ML
4 INJECTION INTRAMUSCULAR; INTRAVENOUS 2 TIMES DAILY PRN
Status: DISCONTINUED | OUTPATIENT
Start: 2023-09-06 | End: 2023-09-06 | Stop reason: HOSPADM

## 2023-09-06 RX ORDER — ONDANSETRON 4 MG/1
4 TABLET, ORALLY DISINTEGRATING ORAL EVERY 12 HOURS PRN
Status: DISCONTINUED | OUTPATIENT
Start: 2023-09-06 | End: 2023-09-06 | Stop reason: HOSPADM

## 2023-09-06 RX ORDER — IPRATROPIUM BROMIDE AND ALBUTEROL SULFATE 2.5; .5 MG/3ML; MG/3ML
3 SOLUTION RESPIRATORY (INHALATION)
Status: DISCONTINUED | OUTPATIENT
Start: 2023-09-06 | End: 2023-09-06 | Stop reason: HOSPADM

## 2023-09-06 RX ORDER — SCOLOPAMINE TRANSDERMAL SYSTEM 1 MG/1
1 PATCH, EXTENDED RELEASE TRANSDERMAL
Status: DISCONTINUED | OUTPATIENT
Start: 2023-09-06 | End: 2023-09-06 | Stop reason: HOSPADM

## 2023-09-06 RX ORDER — DEXTROSE MONOHYDRATE 50 MG/ML
INJECTION, SOLUTION INTRAVENOUS CONTINUOUS PRN
Status: DISCONTINUED | OUTPATIENT
Start: 2023-09-06 | End: 2023-09-06 | Stop reason: HOSPADM

## 2023-09-06 RX ORDER — CHLORHEXIDINE GLUCONATE ORAL RINSE 1.2 MG/ML
15 SOLUTION DENTAL
Status: COMPLETED | OUTPATIENT
Start: 2023-09-06 | End: 2023-09-06

## 2023-09-06 RX ORDER — METOPROLOL SUCCINATE 25 MG/1
25 TABLET, EXTENDED RELEASE ORAL
Status: DISCONTINUED | OUTPATIENT
Start: 2023-09-06 | End: 2023-09-06 | Stop reason: HOSPADM

## 2023-09-06 RX ORDER — LIDOCAINE HYDROCHLORIDE 10 MG/ML
5-10 INJECTION, SOLUTION EPIDURAL; INFILTRATION; INTRACAUDAL; PERINEURAL PRN
Status: DISCONTINUED | OUTPATIENT
Start: 2023-09-06 | End: 2023-09-06 | Stop reason: HOSPADM

## 2023-09-06 RX ORDER — DIPHENHYDRAMINE HYDROCHLORIDE 50 MG/ML
12.5 INJECTION INTRAMUSCULAR; INTRAVENOUS
Status: DISCONTINUED | OUTPATIENT
Start: 2023-09-06 | End: 2023-09-06 | Stop reason: HOSPADM

## 2023-09-06 RX ORDER — PROMETHAZINE HYDROCHLORIDE 25 MG/1
25 TABLET ORAL EVERY 6 HOURS PRN
Status: DISCONTINUED | OUTPATIENT
Start: 2023-09-06 | End: 2023-09-06 | Stop reason: HOSPADM

## 2023-09-06 RX ORDER — LIDOCAINE HYDROCHLORIDE 10 MG/ML
INJECTION, SOLUTION INFILTRATION; PERINEURAL PRN
Status: DISCONTINUED | OUTPATIENT
Start: 2023-09-06 | End: 2023-09-06

## 2023-09-06 RX ORDER — IBUPROFEN 400 MG/1
400 TABLET ORAL EVERY 8 HOURS PRN
Status: DISCONTINUED | OUTPATIENT
Start: 2023-09-06 | End: 2023-09-06 | Stop reason: HOSPADM

## 2023-09-06 RX ORDER — SODIUM CHLORIDE 9 MG/ML
INJECTION, SOLUTION INTRAVENOUS CONTINUOUS
Status: DISCONTINUED | OUTPATIENT
Start: 2023-09-06 | End: 2023-09-06 | Stop reason: HOSPADM

## 2023-09-06 RX ORDER — HYDRALAZINE HYDROCHLORIDE 20 MG/ML
5 INJECTION INTRAMUSCULAR; INTRAVENOUS EVERY 10 MIN PRN
Status: DISCONTINUED | OUTPATIENT
Start: 2023-09-06 | End: 2023-09-06 | Stop reason: HOSPADM

## 2023-09-06 RX ORDER — DEXAMETHASONE SODIUM PHOSPHATE 4 MG/ML
4 INJECTION, SOLUTION INTRA-ARTICULAR; INTRALESIONAL; INTRAMUSCULAR; INTRAVENOUS; SOFT TISSUE
Status: DISCONTINUED | OUTPATIENT
Start: 2023-09-06 | End: 2023-09-06 | Stop reason: HOSPADM

## 2023-09-06 RX ADMIN — SODIUM CHLORIDE, POTASSIUM CHLORIDE, SODIUM LACTATE AND CALCIUM CHLORIDE: 600; 310; 30; 20 INJECTION, SOLUTION INTRAVENOUS at 11:50

## 2023-09-06 RX ADMIN — GLYCOPYRROLATE 0.6 MG: 0.2 INJECTION, SOLUTION INTRAMUSCULAR; INTRAVENOUS at 12:34

## 2023-09-06 RX ADMIN — HYDROMORPHONE HYDROCHLORIDE 0.4 MG: 0.5 INJECTION, SOLUTION INTRAMUSCULAR; INTRAVENOUS; SUBCUTANEOUS at 12:55

## 2023-09-06 RX ADMIN — CEFAZOLIN SODIUM 2000 MG: 300 INJECTION, POWDER, LYOPHILIZED, FOR SOLUTION INTRAVENOUS at 12:05

## 2023-09-06 RX ADMIN — KETOROLAC TROMETHAMINE 15 MG: 30 INJECTION, SOLUTION INTRAMUSCULAR at 12:31

## 2023-09-06 RX ADMIN — FENTANYL CITRATE 50 MCG: 50 INJECTION, SOLUTION INTRAMUSCULAR; INTRAVENOUS at 11:54

## 2023-09-06 RX ADMIN — ONDANSETRON 4 MG: 2 INJECTION INTRAMUSCULAR; INTRAVENOUS at 12:31

## 2023-09-06 RX ADMIN — ROCURONIUM BROMIDE 50 MG: 10 INJECTION, SOLUTION INTRAVENOUS at 12:00

## 2023-09-06 RX ADMIN — DEXAMETHASONE SODIUM PHOSPHATE 8 MG: 10 INJECTION INTRAMUSCULAR; INTRAVENOUS at 12:31

## 2023-09-06 RX ADMIN — PROPOFOL 150 MG: 10 INJECTION, EMULSION INTRAVENOUS at 12:00

## 2023-09-06 RX ADMIN — FENTANYL CITRATE 25 MCG: 50 INJECTION INTRAMUSCULAR; INTRAVENOUS at 13:35

## 2023-09-06 RX ADMIN — CHLORHEXIDINE GLUCONATE 15 ML: 1.2 RINSE ORAL at 11:17

## 2023-09-06 RX ADMIN — HYDROCODONE BITARTRATE AND ACETAMINOPHEN 1 TABLET: 5; 325 TABLET ORAL at 13:13

## 2023-09-06 RX ADMIN — LABETALOL HYDROCHLORIDE 10 MG: 5 INJECTION, SOLUTION INTRAVENOUS at 12:37

## 2023-09-06 RX ADMIN — LABETALOL HYDROCHLORIDE 10 MG: 5 INJECTION, SOLUTION INTRAVENOUS at 12:08

## 2023-09-06 RX ADMIN — FENTANYL CITRATE 25 MCG: 50 INJECTION INTRAMUSCULAR; INTRAVENOUS at 13:57

## 2023-09-06 RX ADMIN — FENTANYL CITRATE 25 MCG: 50 INJECTION INTRAMUSCULAR; INTRAVENOUS at 13:21

## 2023-09-06 RX ADMIN — NEOSTIGMINE METHYLSULFATE 4 MG: 1 INJECTION, SOLUTION INTRAVENOUS at 12:34

## 2023-09-06 RX ADMIN — LIDOCAINE HYDROCHLORIDE 50 MG: 10 INJECTION, SOLUTION INFILTRATION; PERINEURAL at 12:00

## 2023-09-06 RX ADMIN — MIDAZOLAM HYDROCHLORIDE 2 MG: 1 INJECTION, SOLUTION INTRAMUSCULAR; INTRAVENOUS at 11:54

## 2023-09-06 ASSESSMENT — PAIN SCALES - GENERAL
PAINLEVEL_OUTOF10: 5
PAINLEVEL_OUTOF10: 4
PAINLEVEL_OUTOF10: 6
PAINLEVEL_OUTOF10: 5
PAINLEVEL_OUTOF10: 7
PAINLEVEL_OUTOF10: 3
PAINLEVEL_OUTOF10: 5
PAINLEVEL_OUTOF10: 4
PAINLEVEL_OUTOF10: 4
PAINLEVEL_OUTOF10: 5

## 2023-09-06 ASSESSMENT — LIFESTYLE VARIABLES
PACK_YEARS: 40
SMOKING_YEARS: 40
SMOKING_STATUS: CURRENT

## 2024-01-05 NOTE — TELEPHONE ENCOUNTER
Patient contacted. Patient likes to fill all his prescriptions at the same time. RESENT hydrochlorothiazide 12.5mg with #90 with 2 refills. Simple: Patient demonstrates quick and easy understanding/Verbalized Understanding

## 2025-01-07 PROBLEM — G47.10 HYPERSOMNIA: Status: ACTIVE | Noted: 2025-01-07

## 2025-01-07 PROBLEM — G47.9 SLEEP DISTURBANCES: Status: ACTIVE | Noted: 2025-01-07

## 2025-01-07 PROBLEM — R06.83 SNORING: Status: ACTIVE | Noted: 2025-01-07

## 2025-01-07 PROBLEM — I10 HYPERTENSION: Status: ACTIVE | Noted: 2025-01-07

## 2025-01-07 PROBLEM — G47.33 OSA (OBSTRUCTIVE SLEEP APNEA): Status: ACTIVE | Noted: 2025-01-07

## 2025-01-07 PROBLEM — Z78.9 DIFFICULTY WITH CPAP USE: Status: ACTIVE | Noted: 2025-01-07

## 2025-02-02 PROBLEM — Z99.89 CPAP (CONTINUOUS POSITIVE AIRWAY PRESSURE) DEPENDENCE: Status: ACTIVE | Noted: 2025-02-02

## 2025-03-24 NOTE — TELEPHONE ENCOUNTER
Called patient LMTCB.
I would not be able to write that CPAP is not indicated because he clearly has obstructive sleep apnea.   If he hears back from the 2 other places and they want help him then we will need to get him to pulmonology who would be able to review this and give h
Informed patient of messsage below. He verbalized understanding.
LMTCB please transfer to triage RN
Patient calling back and advised Dr Selina Martin note, states that do not bother anymore as he will take care of it, states that pulmonology already called him and requesting for OV, but cannot afford to make OV as he is working and  [employer] afford the [place].
Per dr Juliane Bumpers pulmonary dr should be able to read the chip, lmtcb
Pt asking to speak with a nurse again regarding issue below.      CB # [phone]
Pt is requesting a call back   Please leave a ext
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 Pt does not know where to go to have chip read because the cpap machine he has the company is no longer available.  Pt is [facility] to [facility] for DOT but was informed they do not read t
Spent 24 minutes on the phone with pt    Uses CPAP  Pt stts he needs proof that he uses the machine  D/t DOT request, DME out of business so they will not read the chip.   He stts pulmonologist will read but will get charged a $50 co-pay and he will need to
Spoke with patient--states he needs his DOT physical by 8/27/18--needs his CPAP chip read. Patient states his home health company was [facility], but they went out of business 2 months ago.  Patient states neither [facility] nor [facility] can [employer]
lmtcb
lmtcb
4 = No assist / stand by assistance

## 2025-06-16 ENCOUNTER — TELEPHONE (OUTPATIENT)
Facility: CLINIC | Age: 70
End: 2025-06-16

## 2025-06-16 NOTE — TELEPHONE ENCOUNTER
Patient outreach message received:    Entered into 3 yr CLN recall  Recall colon in 3 years per. Colon done 9/06/2022    Recall reminder letter sent out to patient via [employer].

## 2025-08-04 ENCOUNTER — IMAGING SERVICES (OUTPATIENT)
Dept: GENERAL RADIOLOGY | Age: 70
End: 2025-08-04
Attending: INTERNAL MEDICINE

## 2025-08-04 DIAGNOSIS — M25.551 RIGHT HIP PAIN: ICD-10-CM

## 2025-08-04 PROCEDURE — 73523 X-RAY EXAM HIPS BI 5/> VIEWS: CPT | Performed by: STUDENT IN AN ORGANIZED HEALTH CARE EDUCATION/TRAINING PROGRAM

## 2025-08-16 ENCOUNTER — HOSPITAL ENCOUNTER (EMERGENCY)
Facility: HOSPITAL | Age: 70
Discharge: HOME | End: 2025-08-16
Attending: EMERGENCY MEDICINE

## 2025-08-16 ENCOUNTER — APPOINTMENT (OUTPATIENT)
Dept: CT IMAGING | Facility: HOSPITAL | Age: 70
End: 2025-08-16
Attending: EMERGENCY MEDICINE

## 2025-08-16 VITALS
WEIGHT: 198 LBS | RESPIRATION RATE: 20 BRPM | OXYGEN SATURATION: 98 % | HEIGHT: 69 IN | DIASTOLIC BLOOD PRESSURE: 80 MMHG | TEMPERATURE: 98 F | HEART RATE: 60 BPM | SYSTOLIC BLOOD PRESSURE: 132 MMHG | BODY MASS INDEX: 29.33 KG/M2

## 2025-08-16 DIAGNOSIS — M54.17 LUMBOSACRAL RADICULOPATHY: Primary | ICD-10-CM

## 2025-08-16 LAB
ANION GAP SERPL CALC-SCNC: 5 MMOL/L (ref 0–18)
BASOPHILS # BLD AUTO: 0.06 X10(3) UL (ref 0–0.2)
BASOPHILS NFR BLD AUTO: 0.4 %
BUN BLD-MCNC: 16 MG/DL (ref 9–23)
BUN/CREAT SERPL: 14.8 (ref 10–20)
CALCIUM BLD-MCNC: 10 MG/DL (ref 8.7–10.4)
CHLORIDE SERPL-SCNC: 104 MMOL/L (ref 98–112)
CO2 SERPL-SCNC: 30 MMOL/L (ref 21–32)
CREAT BLD-MCNC: 1.08 MG/DL (ref 0.7–1.3)
DEPRECATED RDW RBC AUTO: 45.7 FL (ref 35.1–46.3)
EGFRCR SERPLBLD CKD-EPI 2021: 75 ML/MIN/1.73M2 (ref 60–?)
EOSINOPHIL # BLD AUTO: 0.2 X10(3) UL (ref 0–0.7)
EOSINOPHIL NFR BLD AUTO: 1.4 %
ERYTHROCYTE [DISTWIDTH] IN BLOOD BY AUTOMATED COUNT: 13.1 % (ref 11–15)
GLUCOSE BLD-MCNC: 95 MG/DL (ref 70–99)
HCT VFR BLD AUTO: 48.7 % (ref 39–53)
HGB BLD-MCNC: 16.9 G/DL (ref 13–17.5)
IMM GRANULOCYTES # BLD AUTO: 0.07 X10(3) UL (ref 0–1)
IMM GRANULOCYTES NFR BLD: 0.5 %
LYMPHOCYTES # BLD AUTO: 2.12 X10(3) UL (ref 1–4)
LYMPHOCYTES NFR BLD AUTO: 14.5 %
MCH RBC QN AUTO: 32.8 PG (ref 26–34)
MCHC RBC AUTO-ENTMCNC: 34.7 G/DL (ref 31–37)
MCV RBC AUTO: 94.4 FL (ref 80–100)
MONOCYTES # BLD AUTO: 1.11 X10(3) UL (ref 0.1–1)
MONOCYTES NFR BLD AUTO: 7.6 %
NEUTROPHILS # BLD AUTO: 11.05 X10 (3) UL (ref 1.5–7.7)
NEUTROPHILS # BLD AUTO: 11.05 X10(3) UL (ref 1.5–7.7)
NEUTROPHILS NFR BLD AUTO: 75.6 %
OSMOLALITY SERPL CALC.SUM OF ELEC: 289 MOSM/KG (ref 275–295)
PLATELET # BLD AUTO: 274 10(3)UL (ref 150–450)
POTASSIUM SERPL-SCNC: 4.2 MMOL/L (ref 3.5–5.1)
RBC # BLD AUTO: 5.16 X10(6)UL (ref 3.8–5.8)
SODIUM SERPL-SCNC: 139 MMOL/L (ref 136–145)
WBC # BLD AUTO: 14.6 X10(3) UL (ref 4–11)

## 2025-08-16 PROCEDURE — 72131 CT LUMBAR SPINE W/O DYE: CPT | Performed by: EMERGENCY MEDICINE

## 2025-08-16 PROCEDURE — 99284 EMERGENCY DEPT VISIT MOD MDM: CPT

## 2025-08-16 PROCEDURE — 96374 THER/PROPH/DIAG INJ IV PUSH: CPT

## 2025-08-16 PROCEDURE — 85025 COMPLETE CBC W/AUTO DIFF WBC: CPT | Performed by: EMERGENCY MEDICINE

## 2025-08-16 PROCEDURE — 80048 BASIC METABOLIC PNL TOTAL CA: CPT | Performed by: EMERGENCY MEDICINE

## 2025-08-16 RX ORDER — METHOCARBAMOL 500 MG/1
500 TABLET, FILM COATED ORAL 3 TIMES DAILY PRN
Qty: 20 TABLET | Refills: 0 | Status: SHIPPED | OUTPATIENT
Start: 2025-08-16

## 2025-08-16 RX ORDER — CYCLOBENZAPRINE HCL 5 MG
10 TABLET ORAL ONCE
Status: COMPLETED | OUTPATIENT
Start: 2025-08-16 | End: 2025-08-16

## 2025-08-16 RX ORDER — METHYLPREDNISOLONE 4 MG/1
TABLET ORAL
Qty: 1 EACH | Refills: 0 | Status: SHIPPED | OUTPATIENT
Start: 2025-08-16

## 2025-08-16 RX ORDER — HYDROCODONE BITARTRATE AND ACETAMINOPHEN 5; 325 MG/1; MG/1
1 TABLET ORAL EVERY 6 HOURS PRN
Qty: 20 TABLET | Refills: 0 | Status: SHIPPED | OUTPATIENT
Start: 2025-08-16 | End: 2025-08-23

## 2025-08-16 RX ORDER — KETOROLAC TROMETHAMINE 15 MG/ML
15 INJECTION, SOLUTION INTRAMUSCULAR; INTRAVENOUS ONCE
Status: COMPLETED | OUTPATIENT
Start: 2025-08-16 | End: 2025-08-16

## 2025-08-19 DIAGNOSIS — M43.16 SPONDYLOLISTHESIS, LUMBAR REGION: Primary | ICD-10-CM

## 2025-08-20 ENCOUNTER — LAB SERVICES (OUTPATIENT)
Dept: LAB | Age: 70
End: 2025-08-20
Attending: NEUROLOGICAL SURGERY

## 2025-08-20 DIAGNOSIS — M43.16 SPONDYLOLISTHESIS, LUMBAR REGION: Primary | ICD-10-CM

## 2025-08-20 LAB
BUN SERPL-MCNC: 26 MG/DL (ref 6–20)
CREAT SERPL-MCNC: 1 MG/DL (ref 0.67–1.17)
EGFRCR SERPLBLD CKD-EPI 2021: 82 ML/MIN/{1.73_M2}

## 2025-08-20 PROCEDURE — 84520 ASSAY OF UREA NITROGEN: CPT

## 2025-08-20 PROCEDURE — 82565 ASSAY OF CREATININE: CPT

## 2025-08-20 PROCEDURE — 36415 COLL VENOUS BLD VENIPUNCTURE: CPT

## 2025-08-29 ENCOUNTER — TELEPHONE (OUTPATIENT)
Dept: GENERAL RADIOLOGY | Age: 70
End: 2025-08-29

## 2025-09-02 ENCOUNTER — TELEPHONE (OUTPATIENT)
Dept: GENERAL RADIOLOGY | Age: 70
End: 2025-09-02

## 2025-09-05 ENCOUNTER — HOSPITAL ENCOUNTER (OUTPATIENT)
Dept: CT IMAGING | Age: 70
Discharge: HOME | End: 2025-09-05
Attending: NEUROLOGICAL SURGERY

## 2025-09-05 ENCOUNTER — HOSPITAL ENCOUNTER (OUTPATIENT)
Dept: GENERAL RADIOLOGY | Age: 70
Discharge: HOME | End: 2025-09-05
Attending: NEUROLOGICAL SURGERY

## 2025-09-05 VITALS
HEIGHT: 70 IN | OXYGEN SATURATION: 100 % | WEIGHT: 203.26 LBS | RESPIRATION RATE: 22 BRPM | DIASTOLIC BLOOD PRESSURE: 80 MMHG | BODY MASS INDEX: 29.1 KG/M2 | TEMPERATURE: 97.5 F | HEART RATE: 58 BPM | SYSTOLIC BLOOD PRESSURE: 165 MMHG

## 2025-09-05 DIAGNOSIS — M43.16 SPONDYLOLISTHESIS, LUMBAR REGION: ICD-10-CM

## 2025-09-05 PROCEDURE — 72132 CT LUMBAR SPINE W/DYE: CPT

## 2025-09-05 PROCEDURE — 77003 FLUOROGUIDE FOR SPINE INJECT: CPT

## 2025-09-05 PROCEDURE — 10002805 HB CONTRAST AGENT: Performed by: NEUROLOGICAL SURGERY

## 2025-09-05 PROCEDURE — 13000001 HB PHASE II RECOVERY EA 30 MINUTES

## 2025-09-05 RX ORDER — HYDROCODONE BITARTRATE AND ACETAMINOPHEN 5; 325 MG/1; MG/1
1 TABLET ORAL EVERY 6 HOURS
COMMUNITY
Start: 2025-08-16

## 2025-09-05 RX ORDER — SENNOSIDES 8.6 MG
650 CAPSULE ORAL EVERY 8 HOURS PRN
COMMUNITY

## 2025-09-05 RX ORDER — METHOCARBAMOL 500 MG/1
500 TABLET, FILM COATED ORAL 3 TIMES DAILY
COMMUNITY
Start: 2025-08-16

## 2025-09-05 RX ADMIN — IOHEXOL 10 ML: 180 INJECTION INTRAVENOUS at 10:35

## 2025-09-05 ASSESSMENT — PAIN SCALES - GENERAL: PAINLEVEL_OUTOF10: 6

## 2025-09-10 ENCOUNTER — APPOINTMENT (OUTPATIENT)
Dept: CT IMAGING | Age: 70
End: 2025-09-10
Attending: NEUROLOGICAL SURGERY

## 2025-09-10 ENCOUNTER — HOSPITAL ENCOUNTER (OUTPATIENT)
Dept: GENERAL RADIOLOGY | Age: 70
End: 2025-09-10
Attending: NEUROLOGICAL SURGERY

## (undated) DIAGNOSIS — I10 ESSENTIAL HYPERTENSION, BENIGN: ICD-10-CM

## (undated) DEVICE — STERILE POLYISOPRENE POWDER-FREE SURGICAL GLOVES: Brand: PROTEXIS

## (undated) DEVICE — BLADE SHVR COOLCUT 13CM 4MM

## (undated) DEVICE — KIT ENDO ORCAPOD 160/180/190

## (undated) DEVICE — X-RAY DETECTABLE SPONGES,16 PLY: Brand: VISTEC

## (undated) DEVICE — DRAPE SRG 70X60IN SPLT U IMPRV

## (undated) DEVICE — 1010 S-DRAPE TOWEL DRAPE 10/BX: Brand: STERI-DRAPE™

## (undated) DEVICE — COVER SRG CNTRL STRL LF LIGHT HNDL HARMONYAIR M SERIES HRMN

## (undated) DEVICE — 3M™ STERI-STRIP™ COMPOUND BENZOIN TINCTURE 40 BAGS/CARTON 4 CARTONS/CASE C1544: Brand: 3M™ STERI-STRIP™

## (undated) DEVICE — KIT DRN 1/8IN PVC 3 SPRG EVAC

## (undated) DEVICE — ENCORE® LATEX ACCLAIM SIZE 8.5, STERILE LATEX POWDER-FREE SURGICAL GLOVE: Brand: ENCORE

## (undated) DEVICE — LINE MNTR ADLT SET O2 INTMD

## (undated) DEVICE — GLOVE SURG 6 PROTEXIS LF BLUE PF SMTH BEAD CUFF INTLK STRL

## (undated) DEVICE — SUTURE VICRYL 3-0 J761D

## (undated) DEVICE — ELECTRODE PT RTN C30- LB 9FT CORD NONIRRITATE NONSENSITIZE

## (undated) DEVICE — GLOVE SURG 6.5 PROTEXIS LF BLUE PF SMTH BEAD CUFF INTLK STRL

## (undated) DEVICE — MASK PROC W/VISOR ANTIGLARE

## (undated) DEVICE — 3.0MM NEURO (MATCH HEAD) SOFT TOUCH

## (undated) DEVICE — 2% CHLORHEXIDINE SKIN PREP ORANGE 26ML

## (undated) DEVICE — SOL  .9 3000ML

## (undated) DEVICE — Device

## (undated) DEVICE — SNARE ENDOSCOPIC 10MM ROUND

## (undated) DEVICE — BAG SRG CLR 36X30IN

## (undated) DEVICE — ENCORE® LATEX ACCLAIM SIZE 7, STERILE LATEX POWDER-FREE SURGICAL GLOVE: Brand: ENCORE

## (undated) DEVICE — NEEDLE HPO 16GA 1.5IN REG WALL REG BVL LL HUB DEHP-FR STRL

## (undated) DEVICE — TUBING IRR 16FT CNT WV 3 ASCP

## (undated) DEVICE — GLOVE SURG 6.5 PROTEXIS PI MIC LF CRM PF SMTH BEAD CUFF STRL

## (undated) DEVICE — GLOVE SURG 6 PROTEXIS PI MIC LF CRM PF SMTH BEAD CUFF STRL

## (undated) DEVICE — KIT CLEAN ENDOKIT 1.1OZ GOWNX2

## (undated) DEVICE — GOWN SURG LG L3 NONREINFORCE SET IN SLV STRL LF DISP BLUE

## (undated) DEVICE — STRIP 4X.5IN STRSTRP POLY REINFORCE SKNCLS WHT STRL LF

## (undated) DEVICE — GAUZE SPONGES,12 PLY: Brand: CURITY

## (undated) DEVICE — SUTURE ETHILON 3-0 669H

## (undated) DEVICE — GOWN SURG XL L3 NONREINFORCE SET IN SLV STRL LF DISP BLUE

## (undated) DEVICE — SPONGE GAUZE 4X4IN CTN 16 PLY WOVEN FOLD EDGE STRL LF

## (undated) DEVICE — SPK10186 WILSON TABLE KIT: Brand: SPK10186 WILSON TABLE KIT

## (undated) DEVICE — TOWEL SURG 26X16IN WHT CTN RADOPQ STRL LF DISP SFTY

## (undated) DEVICE — 3M™ TEGADERM™ TRANSPARENT FILM DRESSING, 1626W, 4 IN X 4-3/4 IN (10 CM X 12 CM), 50 EACH/CARTON, 4 CARTON/CASE: Brand: 3M™ TEGADERM™

## (undated) DEVICE — GLOVE SURG 7 PROTEXIS LF BLUE PF SMTH BEAD CUFF INTLK STRL

## (undated) DEVICE — SUTURE MONOCRYL 4-0 PS-2

## (undated) DEVICE — BLADE SURGCLPR 37.2MM WIDE GP EXIST HNDL DROP IN CHRG .23MM

## (undated) DEVICE — GLOVE SURG 6 PROTEXIS LF CRM PF BEAD CUFF STRL PLISPRN 11.3

## (undated) DEVICE — NON-ADHERENT DRESSING: Brand: TELFA

## (undated) DEVICE — BLANKET WRM UPR BODY ADULT 74X24IN BR HGR PLMR 2 HOSE PORT

## (undated) DEVICE — GOWN SURG AERO BLUE PERF XLG

## (undated) DEVICE — DERMABOND LIQUID ADHESIVE

## (undated) DEVICE — DRAPE SHEET LG

## (undated) DEVICE — ARTHROSCOPY: Brand: MEDLINE INDUSTRIES, INC.

## (undated) DEVICE — SYRINGE 1ML SLIP TIP STRL TB LF DISP

## (undated) DEVICE — TRAP MCS 40ML 5IN PLS SCR CAP

## (undated) DEVICE — SOL  .9 1000ML BTL

## (undated) DEVICE — SUTURE VICRYL MTPS 4-0 PS2 18IN BRAID COAT ABS UNDYED J496H

## (undated) DEVICE — STERILE LATEX POWDER-FREE SURGICAL GLOVESWITH NITRILE COATING: Brand: PROTEXIS

## (undated) DEVICE — APPLICATOR BNZN TNCT .6ML STRSTRP SKNCLS NONHYPOALLERGENIC

## (undated) DEVICE — 20 ML SYRINGE LUER-LOCK TIP: Brand: MONOJECT

## (undated) DEVICE — MEDI-VAC NON-CONDUCTIVE SUCTION TUBING 6MM X 1.8M (6FT.) L: Brand: CARDINAL HEALTH

## (undated) DEVICE — NON-ADHERENT PADS PREPACK: Brand: TELFA

## (undated) DEVICE — TIP BOVIE 4\" MEGADYNE

## (undated) DEVICE — ENCORE® LATEX MICRO SIZE 6.5, STERILE LATEX POWDER-FREE SURGICAL GLOVE: Brand: ENCORE

## (undated) DEVICE — ZIMMER® STERILE DISPOSABLE TOURNIQUET CUFF WITH PLC, DUAL PORT, SINGLE BLADDER, 30 IN. (76 CM)

## (undated) DEVICE — SUTURE NUROLON 0 CT2 TAPERPOINT 18IN CNTRL RELS BRAID 8 STRN C527D

## (undated) DEVICE — SUTURE VICRYL 0 CT-1

## (undated) DEVICE — SUTURE VICRYL 3-0 SH 27IN BRAID COAT ABS VIOL J316H

## (undated) DEVICE — ABDOMINAL PAD: Brand: CURITY

## (undated) DEVICE — LAMINECTOMY: Brand: MEDLINE INDUSTRIES, INC.

## (undated) DEVICE — CAUTERY BLADE 2IN INS E1455

## (undated) DEVICE — SOLUTION SURG DURA PREP HAZMAT

## (undated) DEVICE — 13CM MALLEABLE APPLICATOR TIP

## (undated) DEVICE — CHLORAPREP 26ML APPLICATOR

## (undated) NOTE — LETTER
Sharon Thakur (Legal Name)     63year old, 12/22/1955    Legal sex:  Male    Marital status:     Race: White    Ethnicity: NON  OR  OR  ETHNICITY    Preferred language: English    Employer: [address] [place] [address]    MRN: [id number] require a referral or authorization, such as [place], please feel free to schedule your appointment immediately. However, if you are unsure about the requirements for authorization, please wait 5-7 days and then contact your physician's office.  A onset 12 minutes and the REM   latency minus awake was 58 minutes.  Of the total sleep time 7% was stage   I, 58% stage II, 16% stage III and 19% stage REM.  Carmella Dragon was one   obstructive apneic event for an apnea index of 0.2 events per hour.   There   were Molly HANLEY #:  [id number]                    Room: [facility]. #:  [id number]   Order Number:  [id number]   Exam Description:   CPAP Polysomnograpy       DATE OF STUDY:  7/10/15     PROCEDURE:  CPAP titration.      INDICATION:  Moderate significant asystole, tachycardia nor atrial fibrillation.    There were 22 episodes of transient sinus bradycardia.  CPAP was initiated   at 5 CWP and titrated upward to a final value of 9 CWP during which the   apnea plus hypopnea index fell to 1.4 events Cover Sheet for scanning   Office Visit  5/10/2021  [facility], [facility], [address], [place] [facility], [place]  Family Medicine  Adult general medical exam +11 more  Dx  Routine Physical  Reason for Visit          Reason for Visit    Routine Physical done  Zoster Vaccines(1 of 2) Never done  Tobacco Cessation Counseling 2 Years due on 08/22/2017  PSA due on 03/03/2020  Annual Physical due on 02/22/2021  Annual Depression Screen due on 09/16/2021  Influenza Vaccine Completed  Pneumococcal Vaccine: Birth CREATSERUM 0.93 09/19/2020     BUNCREA 20.4 (H) 09/19/2020     ANIONGAP 1 09/19/2020     GFRAA 101 09/19/2020     GFRNAA 88 09/19/2020     CA 8.9 09/19/2020      09/19/2020     K 3.4 (L) 09/19/2020      09/19/2020     CO2 35.0 (H) 09/19/2020   eyeglasses   • Pneumonia due to organism     • Sleep apnea       CPAP machine   • Visual impairment       glasses               Past Surgical History:   Procedure Laterality Date   • COLONOSCOPY       • FRACTURE SURGERY         right ankle surgery   • Asked        Hx of Radiation Treatments: Not Asked        Regular use of sun block: Not Asked    Social History Narrative      Not on file     Social Determinants of Health  Financial Resource Strain:       Difficulty of Paying Living Expenses:   Food Inse Head: Normocephalic. Right Ear: Tympanic membrane and ear canal normal.   Left Ear: Tympanic membrane and ear canal normal.   Nose: No mucosal edema or rhinorrhea.   Mouth/Throat: Oropharynx is clear and moist and mucous membranes are normal.   Eyes: Con of colon, unspecified part of colon, unspecified type  -     GASTRO - INTERNAL  Told him because of the colon polyp he really should have a colonoscopy.   Screening for colon cancer  -     GASTRO - INTERNAL     Decreased hearing, right  Has already seen an behavior change advice, including information about personal health harms and benefits. Specifically, he was told that Quitting tobacco is one of the best things he can do for his health. I strongly encouraged him to quit. Agree:  We collaboratively se Vitals:  /67   Pulse 70   Temp 98.4 °F (36.9 °C)   Ht 5' 10\" (1.778 m)   Wt 228 lb 9.6 oz   BMI 32.80 kg/m²   BSA 2.21 m²   Pain Sc 0 - (None)      More Vitals   Flowsheets:  Energy Needs,   MU Functional Status,   PHQ2 Depression Screen,   Suicide History   LOS History   Additional EM Code History

## (undated) NOTE — LETTER
6/16/2025    Zaire Ortiz        [address] [facility] [address]            Dear Zaire Ortiz,      Our records indicate that you are due for an appointment for a Colonoscopy with ALIZA Jerome MD. Our doctors are booking out about 3-6 months in advance for procedures.     Please call our office to schedule this appointment.  Your medical well-being is important to us.    If your insurance requires a referral, please call your primary care office to request one.      Thank you,      The Physicians and Staff at [facility]

## (undated) NOTE — MR AVS SNAPSHOT
[address], [address]  [address] [facility]  [phone]               Thank you for choosing us for your health care visit with Carmen Medina DO.   We are glad to serve you and happy to provide you with this summary Allergies as of May 05, 2017     Lisinopril Coughing                Today's Vital Signs     BP Pulse Temp Height Weight BMI    126/78 mmHg 67 97.5 °F (36.4 °C) (Oral) 5' 10\" (1.778 m) 211 lb (95.709 kg) 30.28 kg/m2         Current Medications          Thi active are less likely to develop some chronic diseases than adults who are inactive.      HOW TO GET STARTED: HOW TO STAY MOTIVATED:   Start activities slowly and build up over time Do what you like   Get your heart pumping – brisk walking, biking, swimmin

## (undated) NOTE — LETTER
7/21/2025    Zaire Ortiz        [address] [facility] [address]            Dear Zaire Ortiz,      Our records indicate that you are due for an appointment for a Colonoscopy with ALIZA Jerome MD. Our doctors are booking out about 3-6 months in advance for procedures.     Please call our office to schedule this appointment.  Your medical well-being is important to us.    If your insurance requires a referral, please call your primary care office to request one.      Thank you,      The Physicians and Staff at [facility]

## (undated) NOTE — MR AVS SNAPSHOT
[address], [address]  [address] [facility]  [phone]               Thank you for choosing us for your health care visit with Inocencio Leon DO.   We are glad to serve you and happy to provide you with this summary by mouth at same time for 3 days then 1 tab each day for 3 days. Commonly known as:  DELTASONE           Tadalafil 10 MG Tabs   Take 1 tablet by mouth daily as needed for Erectile Dysfunction.    Commonly known as:  CIALIS                Where to Get Your

## (undated) NOTE — MR AVS SNAPSHOT
[facility]  [address]  [phone]               Thank you for choosing us for your health care visit with Leidy Britt MD.  We are glad to serve you and happy to provide you with this allison

## (undated) NOTE — IP AVS SNAPSHOT
[facility]    [address], [place], [place] ~ [phone]                Discharge Summary   6/1/2017    Svetlana Edwards           Admission Information        Provider Department    6/1/2017 Clifford Andrea MD [facility] · Keep dressing clean and dry. · You may remove your dressing 24 hours after surgery. You will notice small black sutures over your incisions. Place simple bandaids over each incision until your first post-operative appointment.   · You can shower once you you should go to the nearest urgent care or emergency room immediately for a STAT ultrasound to assess for a blood clot.   · Keys to prevention of  DVT are performing ankle pumps (moving your ankle in an up and down motion) multiple times throughout the day controlled by pain medication, swelling, numbness, tingling, bleeding, fever, or other concerns. · If your call is made after office hours, a physician assistant or nurse practitioner will be available to help you.  There is always a surgeon covering  · To experience mild back pain or soreness for a day or two if you had spinal or epidural anesthesia. · If you had laparoscopic surgery, to feel shoulder pain or discomfort on the day of surgery.    · For some patients to have nausea after surgery/anesthe Future Appointments        Provider Department    6/14/2017 4:00 PM Paulino Michael [facility], [address], [place]       Immunization History as of 6/1/2017  Never Reviewed    No immunizations on file.       Recent Hematology Lab We want to hear from you, please share your experience with us by returning the survey you will receive in the mail. Thank you!         Juany

## (undated) NOTE — Clinical Note
6/6/2017          To Whom It May Concern:    Eliezer Bello is currently under my medical care and may return to work at this time. If you require additional information please contact our office.         Sincerely,    Kera Farmer MD

## (undated) NOTE — LETTER
Lyndsay (Legal Name)     63year old, 12/22/1955    Legal sex:  Male    Marital status:     Race: White    Ethnicity: NON  OR  OR  ETHNICITY    Preferred language: English    Employer: [address] [place] [address]    MRN: [id number] require a referral or authorization, such as [place], please feel free to schedule your appointment immediately. However, if you are unsure about the requirements for authorization, please wait 5-7 days and then contact your physician's office.  A onset 12 minutes and the REM   latency minus awake was 58 minutes.  Of the total sleep time 7% was stage   I, 58% stage II, 16% stage III and 19% stage REM.  Barney Reese was one   obstructive apneic event for an apnea index of 0.2 events per hour.   There   were Anna HANLEY #:  [id number]                    Room: [facility]. #:  [id number]   Order Number:  [id number]   Exam Description:   CPAP Polysomnograpy       DATE OF STUDY:  7/10/15     PROCEDURE:  CPAP titration.      INDICATION:  Moderate significant asystole, tachycardia nor atrial fibrillation.    There were 22 episodes of transient sinus bradycardia.  CPAP was initiated   at 5 CWP and titrated upward to a final value of 9 CWP during which the   apnea plus hypopnea index fell to 1.4 events Cover Sheet for scanning   Office Visit  5/10/2021  [facility], [facility], [address], [place] [facility], [place]  Family Medicine  Adult general medical exam +11 more  Dx  Routine Physical  Reason for Visit          Reason for Visit    Routine Physical done  Zoster Vaccines(1 of 2) Never done  Tobacco Cessation Counseling 2 Years due on 08/22/2017  PSA due on 03/03/2020  Annual Physical due on 02/22/2021  Annual Depression Screen due on 09/16/2021  Influenza Vaccine Completed  Pneumococcal Vaccine: Birth CREATSERUM 0.93 09/19/2020     BUNCREA 20.4 (H) 09/19/2020     ANIONGAP 1 09/19/2020     GFRAA 101 09/19/2020     GFRNAA 88 09/19/2020     CA 8.9 09/19/2020      09/19/2020     K 3.4 (L) 09/19/2020      09/19/2020     CO2 35.0 (H) 09/19/2020   eyeglasses   • Pneumonia due to organism     • Sleep apnea       CPAP machine   • Visual impairment       glasses               Past Surgical History:   Procedure Laterality Date   • COLONOSCOPY       • FRACTURE SURGERY         right ankle surgery   • Asked        Hx of Radiation Treatments: Not Asked        Regular use of sun block: Not Asked    Social History Narrative      Not on file     Social Determinants of Health  Financial Resource Strain:       Difficulty of Paying Living Expenses:   Food Inse Head: Normocephalic. Right Ear: Tympanic membrane and ear canal normal.   Left Ear: Tympanic membrane and ear canal normal.   Nose: No mucosal edema or rhinorrhea.   Mouth/Throat: Oropharynx is clear and moist and mucous membranes are normal.   Eyes: Con of colon, unspecified part of colon, unspecified type  -     GASTRO - INTERNAL  Told him because of the colon polyp he really should have a colonoscopy.   Screening for colon cancer  -     GASTRO - INTERNAL     Decreased hearing, right  Has already seen an behavior change advice, including information about personal health harms and benefits. Specifically, he was told that Quitting tobacco is one of the best things he can do for his health. I strongly encouraged him to quit. Agree:  We collaboratively se Vitals:  /67   Pulse 70   Temp 98.4 °F (36.9 °C)   Ht 5' 10\" (1.778 m)   Wt 228 lb 9.6 oz   BMI 32.80 kg/m²   BSA 2.21 m²   Pain Sc 0 - (None)      More Vitals   Flowsheets:  Energy Needs,   MU Functional Status,   PHQ2 Depression Screen,   Suicide History   LOS History   Additional EM Code History

## (undated) NOTE — LETTER
[address]  [facility] [address]  Dept: [phone]  Dept Fax: [phone]  Loc: [phone]      December 9, 2017    Patient: David Tinsley   Date of Visit: 12/9/2017       To Whom It May Concern:    S

## (undated) NOTE — MR AVS SNAPSHOT
[facility]  [address]  [phone]               Thank you for choosing us for your health care visit with Nan Huffman MD.  We are glad to serve you and happy to provide you with this allison [phone]. At that time, you will be provided with any authorization numbers or be assured that none are required. You can then schedule your appointment. Failure to obtain required authorization numbers can create reimbursement difficulties for you.     DuP

## (undated) NOTE — LETTER
[address] [place] [address]      Authorization for Surgical Operation and Procedure     Date:___________                                                                                                         Time:__________  1. I hereby Jaren Cross MD, my physician and his/her assistants (if applicable), which may include medical students, residents, and/or fellows, to perform the following surgical operation/ procedure and administer such anesthesia as may be determined necessary by my physician:  Operation/Procedure name (s) [address]   2. I recognize that during the surgical operation/procedure, unforeseen conditions may necessitate additional or different procedures than those listed above. I, therefore, further authorize and request that the above-named surgeon, assistants, or designees perform such procedures as are, in their judgment, necessary and desirable. 3.   My surgeon/physician has discussed prior to my surgery the potential benefits, risks and side effects of this procedure; the likelihood of achieving goals; and potential problems that might occur during recuperation. They also discussed reasonable alternatives to the procedure, including risks, benefits, and side effects related to the alternatives and risks related to not receiving this procedure. I have had all my questions answered and I acknowledge that no guarantee has been made as to the result that may be obtained. 4.   Should the need arise during my operation or immediate post-operative period, I also consent to the administration of blood and/or blood products. Further, I understand that despite careful testing and screening of blood or blood products by collecting agencies, I may still be subject to ill effects as a result of receiving a blood transfusion and/or blood products.   The following are some, but not all, of the potential risks that can occur: fever and allergic reactions, hemolytic reactions, transmission of diseases such as Hepatitis, AIDS and Cytomegalovirus (CMV) and fluid overload. In the event that I wish to have an autologous transfusion of my own blood, or a directed donor transfusion. I will discuss this with my physician. 5.   I authorize the use of any specimen, organs, tissues, body parts or foreign objects that may be removed from my body during the operation/procedure for diagnosis, research or teaching purposes and their subsequent disposal by hospital authorities. I also authorize the release of specimen test results and/or written reports to my treating physician on the hospital medical staff or other referring or consulting physicians involved in my care, at the discretion of the Pathologist or my treating physician. 6.   I consent to the photographing or videotaping of the operations or procedures to be performed, including appropriate portions of my body for medical, scientific, or educational purposes, provided my identity is not revealed by the pictures or by descriptive texts accompanying them. If the procedure has been photographed/videotaped, the surgeon will obtain the original picture, image, videotape or CD. The hospital will not be responsible for storage, release or maintenance of the picture, image, tape or CD.    7.   I consent to the presence of a  or observers in the operating room as deemed necessary by my physician or their designees. 8.   I recognize that in the event my procedure results in extended X-Ray/fluoroscopy time, I may develop a skin reaction. 9. If I have a Do Not Attempt Resuscitation (DNAR) order in place, that status will be suspended while in the operating room, procedural suite, and during the recovery period unless otherwise explicitly stated by me (or a person authorized to consent on my behalf).  The surgeon or my attending physician will determine when the applicable recovery period ends for purposes of reinstating the DNAR order. 10. Patients having a sterilization procedure: I understand that if the procedure is successful the results will be permanent and it will therefore be impossible for me to inseminate, conceive, or bear children. I also understand that the procedure is intended to result in sterility, although the result has not been guaranteed. 11. I acknowledge that my physician has explained sedation/analgesia administration to me including the risk and benefits I consent to the administration of sedation/analgesia as may be necessary or desirable in the judgment of my physician. I CERTIFY THAT I HAVE READ AND FULLY UNDERSTAND THE ABOVE CONSENT TO OPERATION and/or OTHER PROCEDURE.    _________________________________________  __________________________________  Signature of Patient     Signature of Responsible Person         ___________________________________         Printed Name of Responsible Person           _________________________________                  Relationship to Patient  _________________________________________  ______________________________  Signature of Witness          Date  Time    STATEMENT OF PHYSICIAN My signature below affirms that prior to the time of the procedure; I have explained to the patient and/or his/her legal representative, the risks and benefits involved in the proposed treatment and any reasonable alternative to the proposed treatment. I have also explained the risks and benefits involved in refusal of the proposed treatment and alternatives to the proposed treatment and have answered the patient's questions. If I have a significant financial interest in a co-management agreement or a significant financial interest in any product or implant, or other significant relationship used in this procedure/surgery, I have disclosed this and had a discussion with my patient. _______________________________________________________________ _____________________________  Yahaira Singleton Physician)                                                                                         (Date)                                   (Time)        Patient Name: Juan Lees    : 1955   Printed: 2022      Medical Record #: [id number]                                              Page 1

## (undated) NOTE — Clinical Note
6/6/2017          To Whom It May Concern:    Florencio Thais is currently under my medical care and may return work on 06/07/17 with the following restrictions:  He is to be light duty  No operation of hazardous machinery  Sitting job with minimum walkin

## (undated) NOTE — Clinical Note
6/7/2017          To Whom It May Concern:    Florencio Plascencia is currently under my medical care. He may return to work on 6/12/17 with no restrictions/ Full duty. If you require additional information please contact our office.         Sincerely,